# Patient Record
Sex: FEMALE | Employment: FULL TIME | URBAN - METROPOLITAN AREA
[De-identification: names, ages, dates, MRNs, and addresses within clinical notes are randomized per-mention and may not be internally consistent; named-entity substitution may affect disease eponyms.]

---

## 2019-03-20 ENCOUNTER — TELEPHONE (OUTPATIENT)
Dept: OTHER | Facility: CLINIC | Age: 24
End: 2019-03-20

## 2020-02-03 ENCOUNTER — TRANSFERRED RECORDS (OUTPATIENT)
Dept: MULTI SPECIALTY CLINIC | Facility: CLINIC | Age: 25
End: 2020-02-03

## 2020-02-03 LAB
C TRACH DNA SPEC QL PROBE+SIG AMP: NEGATIVE
HEP C HIM: NORMAL
HIV 1&2 EXT: NORMAL
N GONORRHOEA DNA SPEC QL PROBE+SIG AMP: NEGATIVE
PAP-ABSTRACT: NORMAL
SPECIMEN DESCRIP: NORMAL
SPECIMEN DESCRIPTION: NORMAL

## 2021-03-31 NOTE — PATIENT INSTRUCTIONS
Thank you for coming to see the Midwives at the   [facility]!      Please take prenatal vitamin with DHA and vitamin D3 4,000-5,000 international unit(s) once daily during the entire pregnancy.      We will notify you about your labs that were drawn today once we get the results back.  If you have MyChart your lab results will be posted there.      Someone from the clinic will call you personally or send you a [email or web address] message with your results.      If you need any refills of medications please call your pharmacy and they will contact us.      If you have a medical emergency please call 911.      If you have any concerns about today's visit, wish to schedule another appointment, or have an urgent medical concern please call our office at [phone]. You can also make appointments through [email or web address].      After hours you may also call the clinic number above to be connected with [place]'s after hours triage nurse.  The nurse can page the midwife on call if needed. There is always a midwife on call 24 hours a day.    Prenatal Care Recommendations:    Before 14 weeks: Dating ultrasound, genetic testing       This ultrasound helps us determine your dates accurately. Innatal (genetic screening test) can be drawn anytime after 10 weeks of gestation.    16 weeks: Optional genetic testing single AFP       This testing helps understand your baby's risk for some genetic abnormalities.    18-22 weeks:  Screening anatomy ultrasound       This testing will look for early growth abnormalities, placenta location, and may tell the baby's gender if you wish to find out.    24-27 weeks: One hour diabetes test (GCT) and complete blood count       This test helps identify diabetes of pregnancy or gestational diabetes.  We also look   at the iron in your blood and how well your blood clots.    28 - 36 weeks: Tetanus shot (Tdap)       This shot helps protect you and your baby from whooping cough.    36 weeks and  later: Group B Strep test (GBS)       This test helps predict if you need antibiotics in labor to prevent infection for your baby.    Anytime September to April:  Flu shot       This shot helps protect you and your family from the flu.  This is especially important during pregnancy.        The typical schedule after your first visit today you can expect:     Visit 2 - 12-16 weeks  Visit 3 - 20 weeks  Visit 4 - 24 weeks  Visit 5 - 28 weeks  Visit 6 - 30 weeks  Visit 7 - 32 weeks  Visit 8 - 34 weeks  Visit 9 - 36 weeks  Weekly after 36 weeks until delivery.        Any time during or after your pregnancy you may experience increased depression and/or mood changes.    We are here to support you. Please contact us if you are:    Feeling anxious    Overwhelmed or sad     Trouble sleeping    Crying uncontrollably    Trouble caring for yourself or baby.    Any thoughts of hurting yourself, your baby, or anyone else    If anything comes up between your visits or you have concerns please don't hesitate to contact us.    Secure access to your medical record:  Use Dragonplay (secure email communication and access to your chart) to send your primary care provider a message or make an appointment. Ask someone on your Team how to sign up for Dragonplay. To log on to [employer] or for more information in Dragonplay please visit the website at [email or web address].       Certified Nurse Midwife (CNM) Team    CHEVY Ang CNM Melissa Kitzman APRN, CNM, [facility]  ALIYA Atkinson DNP, ALIYA Ham DNP, CHEVY      Again, thank you for choosing the midwives at [facility].  We are excited to be a part of your pregnancy. Please let us know how we can best partner with you to improve your and your family's health.    Remedies for nausea and vomiting with pregnancy      Eat small frequent meals every 2-3 hours if possible.       Avoid food at extremes of temperature and  drinks with carbonation.      Eat foods that appeal to you, avoiding fats and spicy foods.      Avoid liquids with foods.  Drink liquids 30-60 minutes before or after eating and sip slowly.      Bread, pasta, crackers, potatoes, and rice tend to be tolerated the best.      Don't worry about what you eat in the first 3 months, it is more important that you can eat and keep it down.       Try flat ginger ale or ginger tea      Before rising in the morning, eat a small amount of crackers or dry toast.      Peppermint tea      Ginger is a herbal remedy for nausea and you can use it in any form.  There are ginger tablets you can purchase.  The dose 1000 mg a day in divided doses.       You may also try doxylamine (Unisom) 12.5 mg three times a day which is a sleeping medication along with Vitamin B6 25 mg three times a day.  This combination takes up to a week to work so give it some time.       Benadryl (diphenhydramine) 25-50 mg every 8 hour or Dramamine (dimenhydrinate)  mg by mouth every 4-6 hours. Both of these medication may cause some drowsiness      Other things that may help include an Accupressure band and acupuncture      If these methods fail there are many prescriptions that we can try    If you begin to vomit more than 5 or 6 times a day and feel that you are unable to keep anything down, call the [facility] at [phone]    Recommendations for total and rate of weight gain during pregnancy, by prepregnancy BMI    Total weight gain Rates of weight gain*  2nd and 3rd trimester   Prepregnancy BMI Range in kg Range in lbs Mean (range) in kg/week Mean (range) in lbs/week   Underweight (<18.5 kg/m2) 12.5-18 28-40 0.51 (0.44-0.58) 1 (1-1.3)   Normal weight (18.5-24.9 kg/m2) 11.5-16 25-35 0.42 (0.35-0.50) 1 (0.8-1)   Overweight (25.0-29.9 kg/m2) 7-11.5 15-25 0.28 (0.23-0.33) 0.6 (0.5-0.7)   Obese (?30.0 kg/m2) 5-9 11-20 0.22 (0.17-0.27) 0.5 (0.4-0.6)   BMI: body mass index.  *  Calculations assume a 0.5-2 kg (1.1-4.4 lbs) weight gain in the first trimester.  * To calculate BMI go to [email or web address]/      Constipation    Constipation can be caused by many factors such as poor diet, lack of activity, and medications. Often times women experience more constipation during pregnancy due to decreased intestinal motility.    DRINK TONS OF WATER! 2.5 liters (4 pints) of water per day      Activity is very important. Increase your daily activity to at least 20-60 minutes. This increases blood flow to your gut and improves bowel function    Limit caffeine and alcohol intake    Avoid foods you've identified as constipating    Increase fiber intake: there are ways to increase you fiber through your diet but there are also OTC agents such as Metamucil or Benfiber that you can supplement your diet with    Use probiotics such as Florastor and/or prebiotics such as oligosaccharides    Consider using an Omega 3 supplement, flaxseed and chapin seeds are great sources    Plan adequate time for elimination, it is most effective right after activity, and it may be beneficial to plan a consistent time daily    Food Suggestions      Eat beans, nuts, whole grain breads and cereals, oats, barley, figs, apples with skin, raisins, green leafy vegetables, fresh and dried fruits (especially grapes), prunes or prune juice    Eat popcorn nightly    Eat 2-3 salads per day    Add a pinch of cayenne pepper to food    1-2 tbsp of olive oil per day    Lemon juice and/or honey in warm water every morning before breakfast    Decrease red meat, refined white flour products like white rice, white bread and pasta    Tea infusions of: rosemary, chamomile, lemon balm, senna leaf, alfalfa, fennel seeds, lavender, cascara, dandelion, licorice root tea, psyllium seeds, marshmallow root    Other remedies      Increase Vitamin B and E (800 IU if not pregnant, 400 IU if pregnant per day) and potassium, calcium, and magnesium  supplements      If these remedies fail, medications are an option as well. Please talk with your midwife if you continue to struggle with constipation. If you are pregnant please double check with us before starting any medications!

## 2021-04-02 ENCOUNTER — PRENATAL OFFICE VISIT (OUTPATIENT)
Dept: NURSING | Facility: CLINIC | Age: 26
End: 2021-04-02

## 2021-04-02 DIAGNOSIS — Z34.91 SUPERVISION OF NORMAL PREGNANCY IN FIRST TRIMESTER: ICD-10-CM

## 2021-04-02 DIAGNOSIS — O36.80X0 PREGNANCY WITH INCONCLUSIVE FETAL VIABILITY: Primary | ICD-10-CM

## 2021-04-02 PROCEDURE — 99207 PR NO CHARGE NURSE ONLY: CPT

## 2021-04-02 RX ORDER — METRONIDAZOLE 7.5 MG/G
1 GEL VAGINAL DAILY
COMMUNITY
End: 2021-04-05

## 2021-04-02 RX ORDER — CALCIUM CARBONATE 500 MG/1
1 TABLET, CHEWABLE ORAL PRN
COMMUNITY
End: 2021-08-13

## 2021-04-02 SDOH — HEALTH STABILITY: MENTAL HEALTH: HOW OFTEN DO YOU HAVE A DRINK CONTAINING ALCOHOL?: NEVER

## 2021-04-02 ASSESSMENT — ANXIETY QUESTIONNAIRES
2. NOT BEING ABLE TO STOP OR CONTROL WORRYING: NOT AT ALL
IF YOU CHECKED OFF ANY PROBLEMS ON THIS QUESTIONNAIRE, HOW DIFFICULT HAVE THESE PROBLEMS MADE IT FOR YOU TO DO YOUR WORK, TAKE CARE OF THINGS AT HOME, OR GET ALONG WITH OTHER PEOPLE: NOT DIFFICULT AT ALL
GAD7 TOTAL SCORE: 2
5. BEING SO RESTLESS THAT IT IS HARD TO SIT STILL: NOT AT ALL
7. FEELING AFRAID AS IF SOMETHING AWFUL MIGHT HAPPEN: NOT AT ALL
6. BECOMING EASILY ANNOYED OR IRRITABLE: SEVERAL DAYS
3. WORRYING TOO MUCH ABOUT DIFFERENT THINGS: NOT AT ALL
1. FEELING NERVOUS, ANXIOUS, OR ON EDGE: NOT AT ALL

## 2021-04-02 ASSESSMENT — PATIENT HEALTH QUESTIONNAIRE - PHQ9
5. POOR APPETITE OR OVEREATING: SEVERAL DAYS
SUM OF ALL RESPONSES TO PHQ QUESTIONS 1-9: 3

## 2021-04-02 NOTE — PROGRESS NOTES
SUBJECTIVE:     HPI:    This is a 26 year old female patient,  who presents for her first obstetrical visit.    GINA: 10/19/2021, by Last Menstrual Period.  She is 11w3d weeks.  Her cycles are regular.  Her last menstrual period was normal.   Since her LMP, she has experienced  nausea, emesis, fatigue, urinary frequency and gassiness).   She denies abdominal pain, headache, loss of appetite, vaginal discharge, dysuria, pelvic pain, urinary urgency, lightheadedness, vaginal bleeding, hemorrhoids and constipation.    Additional History: HSV2, BV  Post partum depression with 1st pregnancy  Mental breakdown     Have you travelled during the pregnancy?No  Have your sexual partner(s) travelled during the pregnancy?No    HISTORY:   Planned Pregnancy: No, but doing ok with it  Marital Status: Single, FOB involved  Occupation: , works from home  Living in Household: Significant Other and Children, Dog    Past History:  Her past medical history   Past Medical History:   Diagnosis Date     Abnormal Pap smear of cervix      Herpes simplex virus (HSV) infection     HSV2 (Diagnosed 1 year ago - asymptomatic)     Postpartum depression      Urinary tract infection    .      She has a history of  N/A    Since her last LMP she denies use of alcohol, tobacco and street drugs.    Past medical, surgical, social and family history were reviewed and updated in [email or web address].    Current Outpatient Medications   Medication     calcium carbonate (TUMS) 500 MG chewable tablet     metroNIDAZOLE (METROGEL) 0.75 % vaginal gel     Prenatal Vit-Fe Fumarate-FA (PRENATAL VITAMIN PO)     No current facility-administered medications for this visit.        ROS:   12 point review of systems negative other than symptoms noted below or in the HPI.  Constitutional: Fatigue  Gastrointestinal: Nausea, Vomiting and Gas    Nurse phone visit completed. Prenatal book and folder (containing standard educational hand-outs and brochures) will  be given to patient at next visit. Information in folder reviewed over the phone. Questions answered. Brochure will be given on optional screening available to assess chromosomal anomalies. Pt undecided regarding NIPT. Pt advised to call the clinic if she has any questions or concerns related to her pregnancy. Prenatal labs future ordered. New prenatal visit scheduled on 4/5/21 with Carmen Santoyo CNM.    No results found for: PAP  PHQ-9 score:    PHQ 4/2/2021   PHQ-9 Total Score 3   Q9: Thoughts of better off dead/self-harm past 2 weeks Not at all     ROBERTA-7 SCORE 4/2/2021   Total Score 2     Patient supplied answers from flow sheet for:  Prenatal OB Questionnaire.  Past Medical History  Have you ever recieved care for your mental health? : (!) Yes  Mental Breakdown 2020  Within the last year, has anyone hit, slapped, kicked or otherwise hurt you?: No  In the last year, has anyone forced you to have sex when you didn't want to?: No    Past Medical History 2   Have you ever received a blood transfusion?: No  Would you accept a blood transfusion if was medically recommended?: Yes  Does anyone in your home smoke?: (!) Yes (E-cig)  Have you ever ?: (!) Yes (Not successfully)  Have you been hospitalized for a nonsurgical reason excluding normal delivery?: (!) Yes (Mental breakdown - 1 year ago)  Have you ever had an abnormal pap smear?: (!) Yes (HPV)    Past Medical History (Continued)  Did your mother take DIVINE or any other hormones when she was pregnant with you?: Unknown    Belkys Logan RN on 4/2/2021 at 2:19 PM

## 2021-04-03 ASSESSMENT — ANXIETY QUESTIONNAIRES: GAD7 TOTAL SCORE: 2

## 2021-04-05 ENCOUNTER — PRENATAL OFFICE VISIT (OUTPATIENT)
Dept: MIDWIFE SERVICES | Facility: CLINIC | Age: 26
End: 2021-04-05
Payer: COMMERCIAL

## 2021-04-05 ENCOUNTER — ANCILLARY PROCEDURE (OUTPATIENT)
Dept: ULTRASOUND IMAGING | Facility: CLINIC | Age: 26
End: 2021-04-05
Payer: COMMERCIAL

## 2021-04-05 VITALS
SYSTOLIC BLOOD PRESSURE: 110 MMHG | HEART RATE: 76 BPM | DIASTOLIC BLOOD PRESSURE: 72 MMHG | WEIGHT: 143 LBS | HEIGHT: 68 IN | BODY MASS INDEX: 21.67 KG/M2

## 2021-04-05 DIAGNOSIS — O36.80X0 PREGNANCY WITH INCONCLUSIVE FETAL VIABILITY: ICD-10-CM

## 2021-04-05 DIAGNOSIS — O09.91 SUPERVISION OF HIGH RISK PREGNANCY IN FIRST TRIMESTER: ICD-10-CM

## 2021-04-05 DIAGNOSIS — Z86.59 HISTORY OF POSTPARTUM DEPRESSION: ICD-10-CM

## 2021-04-05 DIAGNOSIS — Z34.81 ENCOUNTER FOR SUPERVISION OF OTHER NORMAL PREGNANCY IN FIRST TRIMESTER: ICD-10-CM

## 2021-04-05 DIAGNOSIS — Z87.59 HISTORY OF POSTPARTUM DEPRESSION: ICD-10-CM

## 2021-04-05 DIAGNOSIS — R76.8 HSV-2 SEROPOSITIVE: ICD-10-CM

## 2021-04-05 DIAGNOSIS — Z86.59 HISTORY OF SUICIDAL IDEATION: ICD-10-CM

## 2021-04-05 PROBLEM — R87.610 ASCUS WITH POSITIVE HIGH RISK HPV CERVICAL: Status: ACTIVE | Noted: 2018-02-20

## 2021-04-05 PROBLEM — L70.0 ACNE VULGARIS: Status: ACTIVE | Noted: 2021-04-05

## 2021-04-05 PROBLEM — J45.909 ASTHMA: Status: ACTIVE | Noted: 2021-04-05

## 2021-04-05 PROBLEM — R87.810 ASCUS WITH POSITIVE HIGH RISK HPV CERVICAL: Status: ACTIVE | Noted: 2018-02-20

## 2021-04-05 PROBLEM — O09.90 PREGNANCY, SUPERVISION, HIGH-RISK: Status: ACTIVE | Noted: 2021-04-02

## 2021-04-05 LAB
ABO + RH BLD: NORMAL
ABO + RH BLD: NORMAL
ALBUMIN UR-MCNC: NEGATIVE MG/DL
APPEARANCE UR: CLEAR
BILIRUB UR QL STRIP: NEGATIVE
BLD GP AB SCN SERPL QL: NORMAL
BLOOD BANK CMNT PATIENT-IMP: NORMAL
COLOR UR AUTO: YELLOW
ERYTHROCYTE [DISTWIDTH] IN BLOOD BY AUTOMATED COUNT: 13.4 % (ref 10–15)
GLUCOSE UR STRIP-MCNC: NEGATIVE MG/DL
HCT VFR BLD AUTO: 35.3 % (ref 35–47)
HGB BLD-MCNC: 12.1 G/DL (ref 11.7–15.7)
HGB UR QL STRIP: NEGATIVE
KETONES UR STRIP-MCNC: NEGATIVE MG/DL
LEUKOCYTE ESTERASE UR QL STRIP: NEGATIVE
MCH RBC QN AUTO: 26.8 PG (ref 26.5–33)
MCHC RBC AUTO-ENTMCNC: 34.3 G/DL (ref 31.5–36.5)
MCV RBC AUTO: 78 FL (ref 78–100)
NITRATE UR QL: NEGATIVE
PH UR STRIP: 7 PH (ref 5–7)
PLATELET # BLD AUTO: 380 10E9/L (ref 150–450)
RBC # BLD AUTO: 4.51 10E12/L (ref 3.8–5.2)
SOURCE: NORMAL
SP GR UR STRIP: 1.02 (ref 1–1.03)
SPECIMEN EXP DATE BLD: NORMAL
UROBILINOGEN UR STRIP-ACNC: 0.2 EU/DL (ref 0.2–1)
WBC # BLD AUTO: 9 10E9/L (ref 4–11)

## 2021-04-05 PROCEDURE — 76801 OB US < 14 WKS SINGLE FETUS: CPT | Performed by: OBSTETRICS & GYNECOLOGY

## 2021-04-05 PROCEDURE — 85027 COMPLETE CBC AUTOMATED: CPT | Performed by: ADVANCED PRACTICE MIDWIFE

## 2021-04-05 PROCEDURE — 99000 SPECIMEN HANDLING OFFICE-LAB: CPT | Performed by: ADVANCED PRACTICE MIDWIFE

## 2021-04-05 PROCEDURE — 87340 HEPATITIS B SURFACE AG IA: CPT | Performed by: ADVANCED PRACTICE MIDWIFE

## 2021-04-05 PROCEDURE — 86762 RUBELLA ANTIBODY: CPT | Performed by: ADVANCED PRACTICE MIDWIFE

## 2021-04-05 PROCEDURE — 87086 URINE CULTURE/COLONY COUNT: CPT | Performed by: ADVANCED PRACTICE MIDWIFE

## 2021-04-05 PROCEDURE — 86780 TREPONEMA PALLIDUM: CPT | Mod: 90 | Performed by: ADVANCED PRACTICE MIDWIFE

## 2021-04-05 PROCEDURE — 86901 BLOOD TYPING SEROLOGIC RH(D): CPT | Performed by: ADVANCED PRACTICE MIDWIFE

## 2021-04-05 PROCEDURE — 36415 COLL VENOUS BLD VENIPUNCTURE: CPT | Performed by: ADVANCED PRACTICE MIDWIFE

## 2021-04-05 PROCEDURE — 99207 PR FIRST OB VISIT: CPT | Performed by: ADVANCED PRACTICE MIDWIFE

## 2021-04-05 PROCEDURE — 86900 BLOOD TYPING SEROLOGIC ABO: CPT | Performed by: ADVANCED PRACTICE MIDWIFE

## 2021-04-05 PROCEDURE — 86850 RBC ANTIBODY SCREEN: CPT | Performed by: ADVANCED PRACTICE MIDWIFE

## 2021-04-05 PROCEDURE — 99N1100 PR STATISTIC VERIFI PRENATAL TRISOMY 21,18,13: Mod: 90 | Performed by: ADVANCED PRACTICE MIDWIFE

## 2021-04-05 PROCEDURE — 87389 HIV-1 AG W/HIV-1&-2 AB AG IA: CPT | Performed by: ADVANCED PRACTICE MIDWIFE

## 2021-04-05 PROCEDURE — 81003 URINALYSIS AUTO W/O SCOPE: CPT | Performed by: ADVANCED PRACTICE MIDWIFE

## 2021-04-05 ASSESSMENT — MIFFLIN-ST. JEOR: SCORE: 1447.14

## 2021-04-05 NOTE — Clinical Note
Please abstract the following data from this visit with this patient into the appropriate field in Epic:        Other Tests found in the patient's chart through Chart Review/Care Everywhere:    Pap smear done by this group [facility] this date: 2/3/20    Note to [employer]: If this section is blank, no results were found via Chart Review/Care Everywhere.

## 2021-04-05 NOTE — PROGRESS NOTES
"   SUBJECTIVE:      HPI:     This is a 26 year old female patient,  who presents for her first obstetrical visit.     GINA: 10/19/2021, by Last Menstrual Period.  She is 11w3d weeks.  Her cycles are regular.  Her last menstrual period was normal.   Since her LMP, she has experienced  nausea, emesis, fatigue, urinary frequency and gassiness).   She denies abdominal pain, headache, loss of appetite, vaginal discharge, dysuria, pelvic pain, urinary urgency, lightheadedness, vaginal bleeding, hemorrhoids and constipation.     Additional History: Hx HSV2 positive. Currently being treated for BV from primary care provider. States she gets BV infections frequently.     Reports mental breakdown in  related to HSV diagnosis, OB provider recommended ER visit d/t suicidal comments, she was evaluated at [facility] ED and ultimately discharged, she did not require psychiatric hospitalization. Laurence reports her mood is stable right now and does not have any concerns, denies thoughts of suicide. Also had postpartum depression after first baby states \"it was pretty bad but I got through it.\" She did not take any medications and did not do any counseling.   This was a surprise pregnancy but is excited about it, had a mild nausea which resolved a couple weeks ago.        Have you travelled during the pregnancy?No  Have your sexual partner(s) travelled during the pregnancy?No     HISTORY:   Planned Pregnancy: No, but doing ok with it  Marital Status: Single, FOB involved  Occupation: , works from home  Living in Household: Significant Other and Children, Dog        OB HISTORY  OB History    Para Term  AB Living   3 1 1 0 1 1   SAB TAB Ectopic Multiple Live Births   0 0 0 0 1      # Outcome Date GA Lbr Jeyson/2nd Weight Sex Delivery Anes PTL Lv   3 Current            2 AB 2014     TAB      1 Term 13 40w3d   M Vag-Spont   HEIDI      Name: Celestine       History of GDM: No,  PTL : " No,  History of HTN in pregnancy: No,  Thrombocytopenia: No,  History of Sickle Cell, thalassemia or other hereditary blood disorder: No,  Shoulder dystocia: No,  Vacuum Extraction: No  PPH: No   3rd of 4th degree laceration: No   Other complications: No    PERSONAL HISTORY  Exercise Habits:  walking 4-7 days per week.  Employment: Full time.  Her job involves sedentary activity with no potential for toxic exposure.    Travel plans:  are none planned.   Diet: eats regular meals  Abuse concerns? No  Hgb A1c screen:  BMI > 30: No, 1st degree family DM: No, History of GDM: No, PCOS: No, High risk ethnicity: No        Past History:  Her past medical history   Past Medical History        Past Medical History:   Diagnosis Date     Abnormal Pap smear of cervix       Herpes simplex virus (HSV) infection       HSV2 (Diagnosed 1 year ago - asymptomatic)     Postpartum depression       Urinary tract infection        .       She has a history of  N/A     Since her last LMP she denies use of alcohol, tobacco and street drugs.     Past medical, surgical, social and family history were reviewed and updated in [facility].     Current Outpatient Medications   Medication     calcium carbonate (TUMS) 500 MG chewable tablet     metroNIDAZOLE (METROGEL) 0.75 % vaginal gel     Prenatal Vit-Fe Fumarate-FA (PRENATAL VITAMIN PO)      No current facility-administered medications for this visit.          ROS:   12 point review of systems negative other than symptoms noted below or in the HPI.  Constitutional: Fatigue  Gastrointestinal: Nausea, Vomiting and Gas     Nurse phone visit completed. Prenatal book and folder (containing standard educational hand-outs and brochures) will be given to patient at next visit. Information in folder reviewed over the phone. Questions answered. Brochure will be given on optional screening available to assess chromosomal anomalies. Pt undecided regarding NIPT. Pt advised to call the clinic if she has any questions  "or concerns related to her pregnancy. Prenatal labs future ordered. New prenatal visit scheduled on 4/5/21 with Carmen Santoyo CNM.     No results found for: PAP  PHQ-9 score:    PHQ 4/2/2021   PHQ-9 Total Score 3   Q9: Thoughts of better off dead/self-harm past 2 weeks Not at all      ROBERTA-7 SCORE 4/2/2021   Total Score 2      Patient supplied answers from flow sheet for:  Prenatal OB Questionnaire.  Past Medical History  Have you ever recieved care for your mental health? : (!) Yes  Mental Breakdown 2020  Within the last year, has anyone hit, slapped, kicked or otherwise hurt you?: No  In the last year, has anyone forced you to have sex when you didn't want to?: No     Past Medical History 2   Have you ever received a blood transfusion?: No  Would you accept a blood transfusion if was medically recommended?: Yes  Does anyone in your home smoke?: (!) Yes (E-cig)  Have you ever ?: (!) Yes (Not successfully)  Have you been hospitalized for a nonsurgical reason excluding normal delivery?: (!) Yes (Mental breakdown - 1 year ago)  Have you ever had an abnormal pap smear?: (!) Yes (HPV)     Past Medical History (Continued)  Did your mother take DIVINE or any other hormones when she was pregnant with you?: Unknown     Belkys Logan RN on 4/2/2021 at 2:19 PM        OBJECTIVE:     EXAM:  /72   Pulse 76   Ht 1.727 m (5' 8\")   Wt 64.9 kg (143 lb)   LMP 01/12/2021   BMI 21.74 kg/m   Body mass index is 21.74 kg/m .    GENERAL: healthy, alert and no distress  EYES: Eyes grossly normal to inspection, PERRL and conjunctivae and sclerae normal  HENT: ear canals and TM's normal, nose and mouth without ulcers or lesions  NECK: no adenopathy, no asymmetry, masses, or scars and thyroid normal to palpation  RESP: lungs clear to auscultation - no rales, rhonchi or wheezes  CV: regular rate and rhythm, normal S1 S2, no S3 or S4, no murmur, click or rub, no peripheral edema   ABDOMEN: soft, nontender, no " hepatosplenomegaly, no masses and bowel sounds normal  MS: no gross musculoskeletal defects noted, no edema  SKIN: no suspicious lesions or rashes  NEURO: Normal strength and tone, mentation intact and speech normal  PSYCH: mentation appears normal, affect normal/bright    ASSESSMENT/PLAN:       ICD-10-CM    1. Encounter for supervision of other normal pregnancy in first trimester  Z34.81 Treponema Abs w Reflex to RPR and Titer     Rubella Antibody IgG Quantitative     HIV Antigen Antibody Combo     CBC with platelets     Hepatitis B surface antigen     ABO/Rh type and screen     Non Invasive Prenatal Test Cell Free DNA   2. HSV-2 seropositive  R76.8    3. History of postpartum depression  Z87.59     Z86.59    4. Supervision of high risk pregnancy in first trimester  O09.91    5. History of suicidal ideation  Z86.59        26 year old , On 2021 patient is 11w6d weeks of pregnancy with GINA of 10/19/2021, by Last Menstrual Period       consult for US for AMA patients: No   Genetic Testing reviewed and discussed, patient desires. Handout provided, consent reviewed and signed. To be drawn with rest of labs today.     COUNSELING    Instructed on use of triage nurse line and contacting the on call CNM after hours in an emergency.     Discussed importance of mental health support in pregnancy. Encouraged to let us know if she feels her mood symptoms worsening or has any concerns at all. Discussed increased risk for PPD given history.     Discussed recommendation for HSV prophylaxis at 36 weeks.     Advised to let us know if BV symptoms do not improved after abx course. Will collect wet prep at next appt to ensure infection has resolved.     Reviewed CNM philosophy, call schedule for labor and delivery, and FSH for delivery    1st OB handout given outlining appointment spacing and CNM information    Reviewed exercise and nutrition    Recommend to gain 25 pounds with her pregnancy.    Discussed OTC  "medications. OB med list given    Encouraged patient to take PNV's/DHA    Travel precautions discussed, no air travel after 36 weeks and COVID recommendations discussed    Will call patient with lab results when available      F/U to be addressed next visit: wet prep, mood check.     Upon further review of chart related to ER visit d/t suicidal ideation: pt made comment that she \"may have committed suicide if she received her positive HSV results over the phone\"  so incase of positive/sensitive lab results, give the results in person visit.     Will return to the clinic in 4 weeks for her next routine prenatal check.  Will call to be seen sooner if problems arise.      FLOR Stephens  [facility]      I, Shantel Muir, am serving as a scribe; to document services personally performed by ALIYA Bah CNM- based on data collection and the provider's statements to me.    Provider Disclosure:  I agree with above History, Review of Systems, Physical exam and Plan. I have reviewed the content of the documentation and have edited it as needed. I have personally performed the services documented here and the documentation accurately represents those services and the decisions I have made.    Carmen Hill,        "

## 2021-04-06 LAB
BACTERIA SPEC CULT: NORMAL
HBV SURFACE AG SERPL QL IA: NONREACTIVE
HIV 1+2 AB+HIV1 P24 AG SERPL QL IA: NONREACTIVE
Lab: NORMAL
RUBV IGG SERPL IA-ACNC: 34 IU/ML
SPECIMEN SOURCE: NORMAL
T PALLIDUM AB SER QL: NONREACTIVE

## 2021-04-06 NOTE — RESULT ENCOUNTER NOTE
Results discussed directly with patient while patient was present. Any further details documented in the note.     ALIYA Denny, OSWALDOM

## 2021-04-07 NOTE — RESULT ENCOUNTER NOTE
Please call Laurence and let her know that all of her labs came back as normal for pregnancy.  Thanks!    Elaine PISANO CNM, [facility]  [phone]

## 2021-04-08 LAB — LAB SCANNED RESULT: NORMAL

## 2021-04-09 ENCOUNTER — TELEPHONE (OUTPATIENT)
Dept: MIDWIFE SERVICES | Facility: CLINIC | Age: 26
End: 2021-04-09

## 2021-04-09 NOTE — TELEPHONE ENCOUNTER
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    Testing done:  Innatal Prenatal Screen    Action:  Mailbox full Sent [employer] message with negative result and to call/message w  Questions or interested in gender    Gender:  Will ask patient if they wish to know the gender.    Phyllis Johnson RN

## 2021-04-10 ENCOUNTER — HEALTH MAINTENANCE LETTER (OUTPATIENT)
Age: 26
End: 2021-04-10

## 2021-04-30 ENCOUNTER — PRENATAL OFFICE VISIT (OUTPATIENT)
Dept: OBGYN | Facility: CLINIC | Age: 26
End: 2021-04-30
Payer: COMMERCIAL

## 2021-04-30 VITALS — SYSTOLIC BLOOD PRESSURE: 110 MMHG | DIASTOLIC BLOOD PRESSURE: 70 MMHG | BODY MASS INDEX: 23.42 KG/M2 | WEIGHT: 154 LBS

## 2021-04-30 DIAGNOSIS — Z36.89 ENCOUNTER FOR FETAL ANATOMIC SURVEY: Primary | ICD-10-CM

## 2021-04-30 DIAGNOSIS — G43.009 MIGRAINE WITHOUT AURA AND WITHOUT STATUS MIGRAINOSUS, NOT INTRACTABLE: ICD-10-CM

## 2021-04-30 DIAGNOSIS — O09.92 SUPERVISION OF HIGH RISK PREGNANCY IN SECOND TRIMESTER: ICD-10-CM

## 2021-04-30 PROCEDURE — 36415 COLL VENOUS BLD VENIPUNCTURE: CPT | Performed by: OBSTETRICS & GYNECOLOGY

## 2021-04-30 PROCEDURE — 82105 ALPHA-FETOPROTEIN SERUM: CPT | Mod: 90 | Performed by: OBSTETRICS & GYNECOLOGY

## 2021-04-30 PROCEDURE — 99000 SPECIMEN HANDLING OFFICE-LAB: CPT | Performed by: OBSTETRICS & GYNECOLOGY

## 2021-04-30 PROCEDURE — 99207 PR PRENATAL VISIT: CPT | Performed by: OBSTETRICS & GYNECOLOGY

## 2021-04-30 RX ORDER — MULTIVITAMIN WITH IRON
1 TABLET ORAL DAILY
Qty: 90 TABLET | Refills: 3 | Status: SHIPPED | OUTPATIENT
Start: 2021-04-30 | End: 2021-08-13

## 2021-04-30 NOTE — PROGRESS NOTES
Anatomy ultrasound ordered  Headaches- discussed options  Could consider other medication depending on frequency  Discussed magnesium  No vaginal bleeding  afp today

## 2021-05-03 LAB
# FETUSES US: NORMAL
# FETUSES: 1
AFP ADJ MOM AMN: 0.65
AFP SERPL-MCNC: 21 NG/ML
AGE - REPORTED: 24.8 YR
CURRENT SMOKER: NO
CURRENT SMOKER: NO
DIABETES STATUS PATIENT: NO
EGG DONOR AGE: NORMAL
FAMILY MEMBER DISEASES HX: NO
FAMILY MEMBER DISEASES HX: NO
GA METHOD: NORMAL
GA METHOD: NORMAL
GA: NORMAL WK
IDDM PATIENT QL: NO
INTEGRATED SCN PATIENT-IMP: NORMAL
IVF PREGNANCY: NO
LMP START DATE: NORMAL
MONOCHORIONIC TWINS: NO
SERVICE CMNT-IMP: NO
SPECIMEN DRAWN SERPL: NORMAL
VALPROIC/CARBAMAZEPINE STATUS: NO
WEIGHT UNITS: NORMAL

## 2021-05-18 NOTE — PATIENT INSTRUCTIONS
Constipation    Constipation can be caused by many factors such as poor diet, lack of activity, and medications. Often times women experience more constipation during pregnancy due to decreased intestinal motility.    DRINK TONS OF WATER! 2.5 liters (4 pints) of water per day      Activity is very important. Increase your daily activity to at least 20-60 minutes. This increases blood flow to your gut and improves bowel function    Limit caffeine and alcohol intake    Avoid foods you've identified as constipating    Increase fiber intake: there are ways to increase you fiber through your diet but there are also OTC agents such as Metamucil or Benfiber that you can supplement your diet with    Use probiotics such as Florastor and/or prebiotics such as oligosaccharides    Consider using an Omega 3 supplement, flaxseed and chapin seeds are great sources    Plan adequate time for elimination, it is most effective right after activity, and it may be beneficial to plan a consistent time daily    Food Suggestions      Eat beans, nuts, whole grain breads and cereals, oats, barley, figs, apples with skin, raisins, green leafy vegetables, fresh and dried fruits (especially grapes), prunes or prune juice    Eat popcorn nightly    Eat 2-3 salads per day    Add a pinch of cayenne pepper to food    1-2 tbsp of olive oil per day    Lemon juice and/or honey in warm water every morning before breakfast    Decrease red meat, refined white flour products like white rice, white bread and pasta    Tea infusions of: rosemary, chamomile, lemon balm, senna leaf, alfalfa, fennel seeds, lavender, cascara, dandelion, licorice root tea, psyllium seeds, marshmallow root    Other remedies      Increase Vitamin B and E (800 IU if not pregnant, 400 IU if pregnant per day) and potassium, calcium, and magnesium supplements      If these remedies fail, medications are an option as well. Please talk with your midwife if you continue to struggle with  constipation. If you are pregnant please double check with us before starting any medications!    Fiber Facts    A daily intake of about 25-30 grams of fiber is recommended. Most Americans only get about 12 grams of fiber on average. Fiber is very important in the diet to promote bowel regularity, lower cholesterol, lower risk of developing type 2 diabetes, and decrease chance of weight gain.  In pregnancy your intestinal motility slows down, so fiber is even more important.    Start gradually increasing fiber intake to reduce the risk of bloating and gas. Increase by about 5 grams a day every few days until you reach your fiber goals!    Food      Amount   Grams of Fiber  Grains  [place]'s All Bran Cereal   1/2 cup   10  Natural [employer]'s Stay Trim Bread 1 slice    5  Brown Rice (cooked)  1 cup    4  Cheerios    1 cup    3  Whole Wheat Crackers  5 crackers   3.5  Rye crackers    3 crackers   3    Cereals and whole grains are excellent ways to increase fiber in your diet. Try to find cereals that have at least 8 grams of fiber per serving.    Fruits  Blackberries     1 cup    7   Figs      3 dried   7  Apple      1     4  Pear     1    4   Orange    1    3    Vegetables  Winter squash   1 cup    9  Broccoli     1 cup    4  Corn      1 cup    4  Swiss chard (cooked)  1 cup     4  Cauliflower     1 cup     3  Potato     1 large w/skin  5    Beans  Kidney, red    1/2 cup   8  Lentils     1/2 cup cooked  8  Black     1/2 cup    7  Navy     1/2 cup   7  Walker     1/2 cup   7  White      1/2 cup   6  Garbanzo/Chickpeas  1/2 cup   5

## 2021-05-18 NOTE — PROGRESS NOTES
Feels well overall.   Fetal movement No  Denies loss of fluid/vb/contractions/pelvic pain  Headaches in pregnancy. Has not started the magnesium rx'd by Dr. Akhtar. She has taken Excedrin a few times when headaches get really bad (about 3 times in pregnancy). Discussed Excedrin is okay if used rarely (less than 1x every few weeks). Want her to start taking the Mg. If that doesn't help we can rx Imitrex.   Frequent BV. Currently no symptoms today. She knows when she gets it and will let us know.   GC/Chlamydia urine collection today  Return to clinic 2-4 weeks for anatomy ultrasound and prenatal appt    Beth Sanchez, DNP, APRN, CNM

## 2021-05-19 ENCOUNTER — PRENATAL OFFICE VISIT (OUTPATIENT)
Dept: MIDWIFE SERVICES | Facility: CLINIC | Age: 26
End: 2021-05-19
Payer: COMMERCIAL

## 2021-05-19 VITALS — DIASTOLIC BLOOD PRESSURE: 68 MMHG | WEIGHT: 155 LBS | SYSTOLIC BLOOD PRESSURE: 112 MMHG | BODY MASS INDEX: 23.57 KG/M2

## 2021-05-19 DIAGNOSIS — Z86.59 HISTORY OF POSTPARTUM DEPRESSION: ICD-10-CM

## 2021-05-19 DIAGNOSIS — Z11.8 ENCOUNTER FOR SCREENING EXAMINATION FOR CHLAMYDIAL INFECTION: ICD-10-CM

## 2021-05-19 DIAGNOSIS — Z87.59 HISTORY OF POSTPARTUM DEPRESSION: ICD-10-CM

## 2021-05-19 DIAGNOSIS — Z11.3 SCREENING FOR GONORRHEA: ICD-10-CM

## 2021-05-19 DIAGNOSIS — O09.92 SUPERVISION OF HIGH RISK PREGNANCY IN SECOND TRIMESTER: Primary | ICD-10-CM

## 2021-05-19 PROCEDURE — 87491 CHLMYD TRACH DNA AMP PROBE: CPT | Performed by: ADVANCED PRACTICE MIDWIFE

## 2021-05-19 PROCEDURE — 99207 PR PRENATAL VISIT: CPT | Performed by: ADVANCED PRACTICE MIDWIFE

## 2021-05-19 PROCEDURE — 87591 N.GONORRHOEAE DNA AMP PROB: CPT | Performed by: ADVANCED PRACTICE MIDWIFE

## 2021-05-20 LAB
C TRACH DNA SPEC QL NAA+PROBE: NEGATIVE
N GONORRHOEA DNA SPEC QL NAA+PROBE: NEGATIVE
SPECIMEN SOURCE: NORMAL
SPECIMEN SOURCE: NORMAL

## 2021-05-20 NOTE — RESULT ENCOUNTER NOTE
Informed via [employer].     Elaine PISANO CNM, [facility]  [phone]

## 2021-06-04 ENCOUNTER — ANCILLARY PROCEDURE (OUTPATIENT)
Dept: ULTRASOUND IMAGING | Facility: CLINIC | Age: 26
End: 2021-06-04
Attending: OBSTETRICS & GYNECOLOGY
Payer: COMMERCIAL

## 2021-06-04 ENCOUNTER — PRENATAL OFFICE VISIT (OUTPATIENT)
Dept: OBGYN | Facility: CLINIC | Age: 26
End: 2021-06-04
Attending: OBSTETRICS & GYNECOLOGY
Payer: COMMERCIAL

## 2021-06-04 VITALS — WEIGHT: 159 LBS | DIASTOLIC BLOOD PRESSURE: 60 MMHG | BODY MASS INDEX: 24.18 KG/M2 | SYSTOLIC BLOOD PRESSURE: 110 MMHG

## 2021-06-04 DIAGNOSIS — O09.92 SUPERVISION OF HIGH RISK PREGNANCY IN SECOND TRIMESTER: Primary | ICD-10-CM

## 2021-06-04 DIAGNOSIS — Z36.89 ENCOUNTER FOR FETAL ANATOMIC SURVEY: ICD-10-CM

## 2021-06-04 PROCEDURE — 76805 OB US >/= 14 WKS SNGL FETUS: CPT | Performed by: OBSTETRICS & GYNECOLOGY

## 2021-06-04 PROCEDURE — 99207 PR PRENATAL VISIT: CPT | Performed by: OBSTETRICS & GYNECOLOGY

## 2021-06-04 NOTE — PROGRESS NOTES
Anatomy ultrasound done today  Discussed staying away from litter box  No vaginal bleeding  return to clinic 4 weeks  Discussed covid vaccine

## 2021-07-02 ENCOUNTER — PRENATAL OFFICE VISIT (OUTPATIENT)
Dept: OBGYN | Facility: CLINIC | Age: 26
End: 2021-07-02
Payer: COMMERCIAL

## 2021-07-02 VITALS — SYSTOLIC BLOOD PRESSURE: 102 MMHG | BODY MASS INDEX: 24.48 KG/M2 | DIASTOLIC BLOOD PRESSURE: 62 MMHG | WEIGHT: 161 LBS

## 2021-07-02 DIAGNOSIS — T75.89XA EXPOSURE TO CAT FECES, INITIAL ENCOUNTER: Primary | ICD-10-CM

## 2021-07-02 PROCEDURE — 99207 PR PRENATAL VISIT: CPT | Performed by: OBSTETRICS & GYNECOLOGY

## 2021-07-02 PROCEDURE — 36415 COLL VENOUS BLD VENIPUNCTURE: CPT | Performed by: OBSTETRICS & GYNECOLOGY

## 2021-07-02 PROCEDURE — 99000 SPECIMEN HANDLING OFFICE-LAB: CPT | Performed by: OBSTETRICS & GYNECOLOGY

## 2021-07-02 PROCEDURE — 86777 TOXOPLASMA ANTIBODY: CPT | Performed by: OBSTETRICS & GYNECOLOGY

## 2021-07-02 PROCEDURE — 86778 TOXOPLASMA ANTIBODY IGM: CPT | Mod: 90 | Performed by: OBSTETRICS & GYNECOLOGY

## 2021-07-02 NOTE — PROGRESS NOTES
rtc 4 weeks, glucose, cbc, tdap  Headaches have improved  Round ligament  Discussed support belt  Uncertain exposure to cat feces, will get antibody testing today

## 2021-07-04 LAB
T GONDII IGG SER QL: <3 IU/ML (ref 0–7.1)
T GONDII IGM SER-ACNC: <3 AU/ML

## 2021-07-21 DIAGNOSIS — Z36.9 ENCOUNTER FOR ANTENATAL SCREENING OF MOTHER: Primary | ICD-10-CM

## 2021-07-21 DIAGNOSIS — Z23 NEED FOR TDAP VACCINATION: ICD-10-CM

## 2021-07-30 ENCOUNTER — PRENATAL OFFICE VISIT (OUTPATIENT)
Dept: OBGYN | Facility: CLINIC | Age: 26
End: 2021-07-30
Payer: COMMERCIAL

## 2021-07-30 ENCOUNTER — LAB (OUTPATIENT)
Dept: LAB | Facility: CLINIC | Age: 26
End: 2021-07-30
Payer: COMMERCIAL

## 2021-07-30 VITALS — SYSTOLIC BLOOD PRESSURE: 102 MMHG | BODY MASS INDEX: 25.39 KG/M2 | WEIGHT: 167 LBS | DIASTOLIC BLOOD PRESSURE: 60 MMHG

## 2021-07-30 DIAGNOSIS — Z36.9 ENCOUNTER FOR ANTENATAL SCREENING OF MOTHER: ICD-10-CM

## 2021-07-30 DIAGNOSIS — O09.92 SUPERVISION OF HIGH RISK PREGNANCY IN SECOND TRIMESTER: Primary | ICD-10-CM

## 2021-07-30 DIAGNOSIS — Z23 NEED FOR TDAP VACCINATION: ICD-10-CM

## 2021-07-30 LAB
ERYTHROCYTE [DISTWIDTH] IN BLOOD BY AUTOMATED COUNT: 14 % (ref 10–15)
GLUCOSE 1H P 50 G GLC PO SERPL-MCNC: 92 MG/DL (ref 70–129)
HCT VFR BLD AUTO: 29.2 % (ref 35–47)
HGB BLD-MCNC: 9.9 G/DL (ref 11.7–15.7)
MCH RBC QN AUTO: 26.3 PG (ref 26.5–33)
MCHC RBC AUTO-ENTMCNC: 33.9 G/DL (ref 31.5–36.5)
MCV RBC AUTO: 78 FL (ref 78–100)
PLATELET # BLD AUTO: 312 10E3/UL (ref 150–450)
RBC # BLD AUTO: 3.77 10E6/UL (ref 3.8–5.2)
WBC # BLD AUTO: 10 10E3/UL (ref 4–11)

## 2021-07-30 PROCEDURE — 85027 COMPLETE CBC AUTOMATED: CPT

## 2021-07-30 PROCEDURE — 90715 TDAP VACCINE 7 YRS/> IM: CPT | Performed by: OBSTETRICS & GYNECOLOGY

## 2021-07-30 PROCEDURE — 82952 GTT-ADDED SAMPLES: CPT

## 2021-07-30 PROCEDURE — 90471 IMMUNIZATION ADMIN: CPT | Performed by: OBSTETRICS & GYNECOLOGY

## 2021-07-30 PROCEDURE — 99207 PR PRENATAL VISIT: CPT | Performed by: OBSTETRICS & GYNECOLOGY

## 2021-07-30 PROCEDURE — 36415 COLL VENOUS BLD VENIPUNCTURE: CPT

## 2021-07-30 RX ORDER — FERROUS SULFATE 325(65) MG
325 TABLET ORAL
Qty: 90 TABLET | Refills: 3 | Status: ON HOLD | OUTPATIENT
Start: 2021-07-30 | End: 2021-10-16

## 2021-07-30 NOTE — PROGRESS NOTES
HSV proph 36 weeks  Cbc and glucose today  Discussed when to call and pre-term labor  tdap today  No vaginal bleeding  return to clinic 2 weeks

## 2021-07-30 NOTE — NURSING NOTE
Prior to immunization administration, verified patients identity using patient s name and date of birth. Please see Immunization Activity for additional information.     Screening Questionnaire for Adult Immunization    Are you sick today?   No   Do you have allergies to medications, food, a vaccine component or latex?   No   Have you ever had a serious reaction after receiving a vaccination?   No   Do you have a long-term health problem with heart, lung, kidney, or metabolic disease (e.g., diabetes), asthma, a blood disorder, no spleen, complement component deficiency, a cochlear implant, or a spinal fluid leak?  Are you on long-term aspirin therapy?   No   Do you have cancer, leukemia, HIV/AIDS, or any other immune system problem?   No   Do you have a parent, brother, or sister with an immune system problem?   No   In the past 3 months, have you taken medications that affect  your immune system, such as prednisone, other steroids, or anticancer drugs; drugs for the treatment of rheumatoid arthritis, Crohn s disease, or psoriasis; or have you had radiation treatments?   No   Have you had a seizure, or a brain or other nervous system problem?   No   During the past year, have you received a transfusion of blood or blood    products, or been given immune (gamma) globulin or antiviral drug?   No   For women: Are you pregnant or is there a chance you could become       pregnant during the next month? yes   Have you received any vaccinations in the past 4 weeks?   No     Immunization questionnaire answers were all negative.        Per orders of Dr. Akhtar, injection of Tdap given by Lacy Morejon CMA. Patient instructed to remain in clinic for 15 minutes afterwards, and to report any adverse reaction to me immediately.       Screening performed by Lacy Morejon CMA on 7/30/2021 at 11:01 AM.

## 2021-08-11 NOTE — PROGRESS NOTES
Feels well, main concern is intermittent pelvic pressure and tightening. Denies cramping/contractions, denies vaginal symptoms and s/sx of UTI.   Has seen midwives and Dr. Akhtar. Plans to see midwives for her care but states keeps mistakenly getting scheduled with Dr. Akhtar. Reviewed CNMs and MDs are separate, make sure she is scheduling with us if planning to deliver with CNMs.   Fetal Movement: positive, denies loss of fluid/vb, some pressure and BH  Fetal kick counts reviewed and handout given  Reviewed PTL precautions and s/sx of preeclampsia; denies any S&S and aware of what to report  Wet prep, gram stain, yeast culture, UA/UC collected d/t pelvic pressure.     Return to clinic in 2 weeks    ALIYA Denny, OSWALDOM

## 2021-08-11 NOTE — PATIENT INSTRUCTIONS
SIGNS OF  LABOR    Labor is  if it happens more than three weeks before your due date.    It can be hard to know if you are in labor, since the symptoms can be like the normal feelings of pregnancy.  Often, the only difference is the symptoms increase or they don't go away.     Signs of  labor can include:      Contractions which can feel like period cramps or gas pain.  You may feel it in the lower part of your abdomen, in your back, or as a pressure feeling in your bottom.  It is often regular, coming every 5 or 10 minutes, and  lasting about 30-60 seconds. Some contractions are normal during pregnancy (Churchill damian contractions) but if you are feeling more than 5-6 in one hour that is NOT normal    If this occurs empty your bladder, then drink 2-3 glasses of water, eat a snack, and lay down on your left side. Put your hand on your abdomen to count the contractions.  If after one hour of resting you have still had 5-6 contractions call your clinic right away.      If you feel a pop, gush, or trickle of fluid it may mean that your bag of water has broken and you should contact the clinic       You may also experience loose stools and/or rectal pressure       Listen to your body, if something doesn't seem right please call us at the clinic    Risk Factors      Previous  delivery    Bacterial Vaginosis- if you notice a fishy smell to your discharge or experience vaginal itching/discomfort you should be evaluated for infection    Smoking    Drug abuse    Adolescent (teen) pregnancy or advanced maternal age (AMA) age 35 and over    Dehydration (this may not cause  labor but it can cause contractions)    If you think you are in  labor we may do some lab testing in the clinic or send you to the hospital for evaluation    Please call us if you are concerned you are in  labor.    [facility]  [phone]      PREECLAMPSIA SIGNS AND  "SYMPTOMS    Preeclampsia is a dangerous condition that some women develop in the second half of pregnancy. It can also begin after the baby is born.  Preeclampsia causes high blood pressure and can cause problems with many organ systems in your body.  It can also affect the growth of your baby. The exact cause of preeclampsia is unknown, however, there are signs and symptoms to watch for:    -A bad headache that doesn't improve with Tylenol  -Visual changes such as spots, flashes of light, blurry vision  -Pain in the upper right part of your abdomen, especially under the ribs that doesn't go away  -Nausea and/or vomiting  -Feeling extremely tired  -Yellowing of the skin and/or eyes  -Feeling \"not quite right\" or that something is wrong  -An extreme amount of swelling (some swelling in pregnancy is very normal)    If your midwife feels that you are developing preeclampsia, you will have lab tests drawn and will be monitored very closely.     If you are experiencing anyof these symptoms, call the [facility] immediately at [phone].    Fetal Kick Counts    It is important to know when your baby's movements occur. We often get busy with work and life and do not pay close attention to their movements.        Women typically begin feeling movement between 18-22 weeks of gestation, sometimes it can be earlier or later depending on where your placenta is       Movements usually begin feeling like popping or fluttering and as the baby grows they become more pronounced    Toward the end of pregnancy as the baby gets larger they may not move as much or make as big of movements. Babies have maturing sleep cycles as well as not as much room to move and flip. If you are ever concerned about your baby's movements or have not felt the baby move for a while, we recommend you do a fetal kick count. Prior to starting your count drink a glass of water or juice and eat a snack. Then lay down on your side and " begin to count movements.     How to do a Fetal Kick Counts    There are many different ways to monitor your baby's movements. Movements can range from large jabs to small kicks, or wiggles.  Hiccups count!      Count 10 movements in 2 hours when resting and focusing    Count 10 movements in 12 hours when doing normal activity    We recommend that if movements occur but seem decreased that you should be seen in the clinic or hospital for evaluation within 12 hours. If fetal movement is absent or fetal kick counts are low please contact us right away.    If you ever have any concerns about your baby's movements DO NOT HESITATE to call us, we are here for you!    [facility]  [phone]

## 2021-08-13 ENCOUNTER — PRENATAL OFFICE VISIT (OUTPATIENT)
Dept: MIDWIFE SERVICES | Facility: CLINIC | Age: 26
End: 2021-08-13
Payer: COMMERCIAL

## 2021-08-13 VITALS — BODY MASS INDEX: 25.5 KG/M2 | WEIGHT: 167.7 LBS | SYSTOLIC BLOOD PRESSURE: 102 MMHG | DIASTOLIC BLOOD PRESSURE: 58 MMHG

## 2021-08-13 DIAGNOSIS — Z3A.30 30 WEEKS GESTATION OF PREGNANCY: ICD-10-CM

## 2021-08-13 DIAGNOSIS — R10.2 PELVIC PRESSURE IN PREGNANCY: ICD-10-CM

## 2021-08-13 DIAGNOSIS — R76.8 HSV-2 SEROPOSITIVE: ICD-10-CM

## 2021-08-13 DIAGNOSIS — O09.93 SUPERVISION OF HIGH RISK PREGNANCY IN THIRD TRIMESTER: Primary | ICD-10-CM

## 2021-08-13 DIAGNOSIS — O26.899 PELVIC PRESSURE IN PREGNANCY: ICD-10-CM

## 2021-08-13 DIAGNOSIS — Z87.59 HISTORY OF POSTPARTUM DEPRESSION: ICD-10-CM

## 2021-08-13 DIAGNOSIS — Z86.59 HISTORY OF POSTPARTUM DEPRESSION: ICD-10-CM

## 2021-08-13 DIAGNOSIS — J45.20 MILD INTERMITTENT ASTHMA WITHOUT COMPLICATION: ICD-10-CM

## 2021-08-13 LAB
ALBUMIN UR-MCNC: NEGATIVE MG/DL
APPEARANCE UR: CLEAR
BACTERIA #/AREA URNS HPF: ABNORMAL /HPF
BILIRUB UR QL STRIP: NEGATIVE
CLUE CELLS: ABNORMAL
COLOR UR AUTO: YELLOW
GLUCOSE UR STRIP-MCNC: NEGATIVE MG/DL
HGB UR QL STRIP: NEGATIVE
KETONES UR STRIP-MCNC: 15 MG/DL
LEUKOCYTE ESTERASE UR QL STRIP: ABNORMAL
MUCOUS THREADS #/AREA URNS LPF: PRESENT /LPF
NITRATE UR QL: NEGATIVE
NUGENT SCORE: 1
PH UR STRIP: 6 [PH] (ref 5–7)
RBC #/AREA URNS AUTO: ABNORMAL /HPF
SP GR UR STRIP: 1.02 (ref 1–1.03)
SQUAMOUS #/AREA URNS AUTO: ABNORMAL /LPF
TRICHOMONAS, WET PREP: ABNORMAL
UROBILINOGEN UR STRIP-ACNC: 0.2 E.U./DL
WBC #/AREA URNS AUTO: ABNORMAL /HPF
WBC'S/HIGH POWER FIELD, WET PREP: ABNORMAL
WHITE BLOOD CELLS: NORMAL
YEAST, WET PREP: ABNORMAL

## 2021-08-13 PROCEDURE — 87210 SMEAR WET MOUNT SALINE/INK: CPT | Performed by: ADVANCED PRACTICE MIDWIFE

## 2021-08-13 PROCEDURE — 81001 URINALYSIS AUTO W/SCOPE: CPT | Performed by: ADVANCED PRACTICE MIDWIFE

## 2021-08-13 PROCEDURE — 87205 SMEAR GRAM STAIN: CPT | Performed by: ADVANCED PRACTICE MIDWIFE

## 2021-08-13 PROCEDURE — 99207 PR PRENATAL VISIT: CPT | Performed by: ADVANCED PRACTICE MIDWIFE

## 2021-08-13 PROCEDURE — 87086 URINE CULTURE/COLONY COUNT: CPT | Performed by: ADVANCED PRACTICE MIDWIFE

## 2021-08-13 PROCEDURE — 87102 FUNGUS ISOLATION CULTURE: CPT | Performed by: ADVANCED PRACTICE MIDWIFE

## 2021-08-14 LAB — BACTERIA UR CULT: NORMAL

## 2021-08-17 LAB — BACTERIA VAG AEROBE CULT: NORMAL

## 2021-08-26 NOTE — PROGRESS NOTES
"Feels OK, states that overall she is doing well.  She states that she is getting nervous about developing PP depression again.  She states that after she had Celestine in 2013, she had \"really bad\" PP depression, states that she cried every day and felt like she couldn't care for herself or her son, was living alone at the time.  She went to her primary care provider, was dx with PP depression.  She states that she was not started on any medication and did not go to therapy, but was eventually able to get better.  She states that she does not want that to happen again.  She is currently living with S.OBetsy Schuster and her son Celestine, would like to have support established prior to giving birth.   Fetal Movement: positive, denies loss of fluid/vb, no contractions  Fetal kick counts/movement reviewed  Reviewed PTL precautions and s/sx of preeclampsia; denies any S&S and aware of what to report     Peds chosen: Yes  Pediatrician: Hep B, Vit K, Erythromycin and circumcision; advised to check insurance for hospital vs clinic    Plans to breastfeed unsure, states that she was not able to BF her 1st child, is open to try again, but may opt for formula feeding.   Referral placed for MH: psychiatry and counseling.  PPSM resource card given also.   Encouraged to reach out if any worsening signs and symptoms of depression     Return to clinic 2 weeks    Elaine PISANO CNM, [facility]  [phone]    **consider CBC/Hgb at next visit, check mood and on MH referral  "

## 2021-08-26 NOTE — PATIENT INSTRUCTIONS

## 2021-08-27 ENCOUNTER — PRENATAL OFFICE VISIT (OUTPATIENT)
Dept: MIDWIFE SERVICES | Facility: CLINIC | Age: 26
End: 2021-08-27
Payer: COMMERCIAL

## 2021-08-27 VITALS — SYSTOLIC BLOOD PRESSURE: 102 MMHG | BODY MASS INDEX: 26.26 KG/M2 | WEIGHT: 172.7 LBS | DIASTOLIC BLOOD PRESSURE: 60 MMHG

## 2021-08-27 DIAGNOSIS — Z3A.32 32 WEEKS GESTATION OF PREGNANCY: ICD-10-CM

## 2021-08-27 DIAGNOSIS — Z87.59 HISTORY OF POSTPARTUM DEPRESSION: ICD-10-CM

## 2021-08-27 DIAGNOSIS — R76.8 HSV-2 SEROPOSITIVE: ICD-10-CM

## 2021-08-27 DIAGNOSIS — Z86.59 HISTORY OF POSTPARTUM DEPRESSION: ICD-10-CM

## 2021-08-27 DIAGNOSIS — O09.93 SUPERVISION OF HIGH RISK PREGNANCY IN THIRD TRIMESTER: Primary | ICD-10-CM

## 2021-08-27 DIAGNOSIS — O99.013 ANEMIA AFFECTING PREGNANCY IN THIRD TRIMESTER: ICD-10-CM

## 2021-08-27 PROCEDURE — 99207 PR PRENATAL VISIT: CPT | Performed by: NURSE PRACTITIONER

## 2021-09-06 ENCOUNTER — NURSE TRIAGE (OUTPATIENT)
Dept: NURSING | Facility: CLINIC | Age: 26
End: 2021-09-06

## 2021-09-06 NOTE — TELEPHONE ENCOUNTER
"Pt is 34 weeks pregnant, due 10/17/2021. Pt is calling in about having continuous pelvic pain that started this morning. Pt rates pain \"7\". Pt reports no injury, but she was more active than normal this morning. Pt reports she is having trouble moving around now because it is so painful, and it does not feel like contractions. Pt reports her baby is moving normal, and there is no bleeding, or fluids leaking from her vagina. Pt denies any vomiting.  Care advice given, an per protocol pt should be evaluated in labor and delivery. Pt verbalized understanding, and will get a ride to the hospital. Pt was advised to call back if symptoms worsen, or if she has further concerns.     Arnol Zhang RN on 9/6/2021 at 2:07 PM      Reason for Disposition    [1] Pelvic pain AND [2] pregnant > 20 weeks    MODERATE-SEVERE abdominal pain (e.g., interferes with normal activities, awakens from sleep)    Additional Information    Negative: Shock suspected (e.g., cold/pale/clammy skin, too weak to stand, low BP, rapid pulse)    Negative: Passed out (i.e., lost consciousness, collapsed and was not responding)    Negative: Sounds like a life-threatening emergency to the triager    Negative: Passed out (i.e., lost consciousness, collapsed and was not responding)    Negative: Shock suspected (e.g., cold/pale/clammy skin, too weak to stand, low BP, rapid pulse)    Negative: Difficult to awaken or acting confused (e.g., disoriented, slurred speech)    Negative: [1] SEVERE abdominal pain (e.g., excruciating) AND [2] constant AND [3] present > 1 hour    Negative: SEVERE vaginal bleeding (e.g., continuous red blood from vagina, large blood clots)    Negative: Sounds like a life-threatening emergency to the triager    Negative: Followed an abdomen (stomach) injury    Negative: [1] Having contractions or other symptoms of labor (such as vaginal pressure) AND [2] >= 37 weeks pregnant (i.e., term pregnancy)    Negative: [1] Having contractions " "or other symptoms of labor (such as vaginal pressure) AND [2] < 37 weeks pregnant (i.e., )    Negative: [1] Abdominal pain AND [2] pregnant < 20 weeks    Negative: [1] Vomiting AND [2] contains red blood or black (\"coffee ground\") material  (Exception: few red streaks in vomit that only happened once)    Protocols used: PELVIC PAIN - FEMALE-A-AH, PREGNANCY - ABDOMINAL PAIN GREATER THAN 20 WEEKS EGA-A-AH      "

## 2021-09-13 ENCOUNTER — PRENATAL OFFICE VISIT (OUTPATIENT)
Dept: MIDWIFE SERVICES | Facility: CLINIC | Age: 26
End: 2021-09-13
Payer: COMMERCIAL

## 2021-09-13 VITALS — DIASTOLIC BLOOD PRESSURE: 68 MMHG | BODY MASS INDEX: 26.41 KG/M2 | SYSTOLIC BLOOD PRESSURE: 126 MMHG | WEIGHT: 173.7 LBS

## 2021-09-13 DIAGNOSIS — R76.8 HSV-2 SEROPOSITIVE: ICD-10-CM

## 2021-09-13 DIAGNOSIS — O09.93 SUPERVISION OF HIGH RISK PREGNANCY IN THIRD TRIMESTER: Primary | ICD-10-CM

## 2021-09-13 PROCEDURE — 99207 PR PRENATAL VISIT: CPT | Performed by: ADVANCED PRACTICE MIDWIFE

## 2021-09-13 RX ORDER — VALACYCLOVIR HYDROCHLORIDE 1 G/1
1000 TABLET, FILM COATED ORAL DAILY
Qty: 40 TABLET | Refills: 0 | Status: ON HOLD | OUTPATIENT
Start: 2021-09-13 | End: 2021-10-16

## 2021-09-13 NOTE — PATIENT INSTRUCTIONS
GROUP B STREP    Group B Strep (GBS) is a common bacteria that is sometimes found in the vagina, urinary tract or rectum.  It is not harmful typically to adults but can cause serious illness in newborns.  It occasionally is passed from mother to baby during birth.   It is important to test in pregnancy.  When a woman is found to be positive for GBS, either at the first prenatal visit or by taking a culture at 36 weeks, treatment will be offered to reduce the chance of spreading the bacteria to the baby.       Treatment consists of either oral antibiotics early in pregnancy or antibiotics given by IV during labor if testing is positive at 36 weeks.      Even without treatment the baby rarely (1-2% of the time) gets infected.  With treatment the baby almost never gets infected.       There really isn't anything you can do to keep from getting or being positive for GBS.  It isn't sexually transmitted and there are no symptoms if you are positive.       Your midwife will discuss your results with you and make recommendations for treatment.  Fetal Kick Counts    It is important to know when your baby's movements occur. We often get busy with work and life and do not pay close attention to their movements.        Women typically begin feeling movement between 18-22 weeks of gestation, sometimes it can be earlier or later depending on where your placenta is       Movements usually begin feeling like popping or fluttering and as the baby grows they become more pronounced    Toward the end of pregnancy as the baby gets larger they may not move as much or make as big of movements. Babies have maturing sleep cycles as well as not as much room to move and flip. If you are ever concerned about your baby's movements or have not felt the baby move for a while, we recommend you do a fetal kick count. Prior to starting your count drink a glass of water or juice and eat a snack. Then lay down on your side and begin to count  movements.     How to do a Fetal Kick Counts    There are many different ways to monitor your baby's movements. Movements can range from large jabs to small kicks, or wiggles.  Hiccups count!      Count 10 movements in 2 hours when resting and focusing    Count 10 movements in 12 hours when doing normal activity    We recommend that if movements occur but seem decreased that you should be seen in the clinic or hospital for evaluation within 12 hours. If fetal movement is absent or fetal kick counts are low please contact us right away.    If you ever have any concerns about your baby's movements DO NOT HESITATE to call us, we are here for you!    [facility]  [phone]

## 2021-09-13 NOTE — PROGRESS NOTES
Feels ok.   Had some sharp right sided abdominal pain this morning for about an hour.  Able to go back to sleep.  No bowel or bladder changes or issues  Fetal Movement: positive, denies loss of fluid/vb, no  contractions  Fetal kick counts/movement reviewed  RX for valtrex due to HSV hx .  Reviewed PTL precautions and s/sx of preeclampsia; denies any S&S and aware of what to report     Taking hospital tour?  incertain  Have car seat Yes  Discussed GBS/cx check at next visit  Return to clinic 2 weeks

## 2021-09-19 ENCOUNTER — HEALTH MAINTENANCE LETTER (OUTPATIENT)
Age: 26
End: 2021-09-19

## 2021-09-22 NOTE — PATIENT INSTRUCTIONS
"Labor Instructions for Midwife Patients    When to call:  Both during and after office hours call  [phone]. There is a triage RN to take your calls and answer your questions 24 hours a day.  If she cannot answer your question she will page the midwife on call for you.    When to call:  Call anytime you have important concerns about you or your baby.     Call if:    You are having contractions at regular intervals about 5-6 minutes apart lasting 30-60 seconds and becoming increasingly more intense     You have an uncontrollable gush of fluid from your vagina or feel a pop and gush like your water has broken    You have HEAVY bleeding, like heavy period, blood running down your legs, or  soaking a pad.     Some bleeding after a pelvic exam, after intercourse, or in labor when your cervix is dilating is normal and is referred to as \"bloody show\"    You have severe, continuous back or abdominal pain    You feel it is time to go to the hospital    If this is your first labor, call when contractions are very intense and have been about every 3-4 minutes for about an hour    If it is your second labor or more, call when contractions are strong and about every 3-5 minutes or sooner depending on your level of discomfort.     Keep in mind we are always here for you! If you have questions, concerns please don't hesitate to call us.     What to eat/drink in labor: Drink plenty of fluid (water most importantly, juice, soda or tea without caffeine). Eat rice, pasta, soup, cereal, bread/toast, and fruit. Avoid dairy and greasy food as they are difficult to digest and you may experience some nausea during labor.    Comfort measures:    Baths and showers (ok even with ruptured membranes, it may temporarily slow contractions if you are still in the early stage of labor)    Warm/hot packs for back pain or discomfort    Back, belly, or thigh massages    Standing, rocking, walking, leaning over bed or tables, side-lying and " sleeping    Miscellaneous:     Contractions are timed from the beginning of one to the beginning of the next    Try hard to sleep during the early stage of labor when you are not that uncomfortable. Timing of contractions at this point is not important    Even if you cannot sleep, resting in bed or on the couch can help you maintain your energy for labor    When you arrive at the hospital the nurse will check your baby's heartbeat, check your cervix, and will call us. The midwife on call will come in and be with you when you are in active labor    After hours you need to enter the hospital through the emergency room        NATURAL LABOR PREPARATION    So, you're getting closer and closer to your due date and wondering what you can do to help get your body ready for labor.   Here are some natural methods you can try:    NOTE: DO NOT begin any of the following prior to 36 completed weeks of pregnancy!    Evening Primrose Oil: Take 1000mg by mouth three times per day. This encourages the cervix to ripen. Cervical ripening is when your cervix becomes more soft and stretchy to get ready for dilation and effacement.     Red Raspberry Leaf Tea: Red raspberry tea (get it at a Co-op or in the grocery store). Drink three cups per day (hot or cold). This can help to prepare the uterine muscles for labor.      Fetal Kick Counts    It is important to know when your baby's movements occur. We often get busy with work and life and do not pay close attention to their movements.        Women typically begin feeling movement between 18-22 weeks of gestation, sometimes it can be earlier or later depending on where your placenta is       Movements usually begin feeling like popping or fluttering and as the baby grows they become more pronounced    Toward the end of pregnancy as the baby gets larger they may not move as much or make as big of movements. Babies have maturing sleep cycles as well as not as much room to move and flip. If  "you are ever concerned about your baby's movements or have not felt the baby move for a while, we recommend you do a fetal kick count. Prior to starting your count drink a glass of water or juice and eat a snack. Then lay down on your side and begin to count movements.       PREECLAMPSIA SIGNS AND SYMPTOMS    Preeclampsia is a dangerous condition that some women develop in the second half of pregnancy. It can also begin after the baby is born.  Preeclampsia causes high blood pressure and can cause problems with many organ systems in your body.  It can also affect the growth of your baby. The exact cause of preeclampsia is unknown, however, there are signs and symptoms to watch for:    -A bad headache that doesn't improve with Tylenol  -Visual changes such as spots, flashes of light, blurry vision  -Pain in the upper right part of your abdomen, especially under the ribs that doesn't go away  -Nausea and/or vomiting  -Feeling extremely tired  -Yellowing of the skin and/or eyes  -Feeling \"not quite right\" or that something is wrong  -An extreme amount of swelling (some swelling in pregnancy is very normal)    If your midwife feels that you are developing preeclampsia, you will have lab tests drawn and will be monitored very closely.     If you are experiencing anyof these symptoms, call the [facility] immediately at [phone].        "

## 2021-09-22 NOTE — PROGRESS NOTES
Feels well  HSV prophylaxis: Has not been taking. Does not see a reason to, she has never had an outbreak.  Discussed the recommendations and reasons for taking prophylaxis. Encouraged to begin daily suppression. Aware if she has an active lesion(s) during birth the recommendation is a C/S  Fetal movement: positive  Denies bleeding/lof, no contractions  GBS swab done today  Vaginal exam done, confirmed vertex via leopolds or bedside US  Cx:  Deferred   Labor instructions given  Labor preferences/birth plan reviewed: Nothing specific  Unsure if she is planning to breastfeed. Did not breastfeed with her first baby.   Discussed option to try on PP and she can be sent home with a pump if she desires to continue    Warning signs reviewed  Patient denies S&S of pre-eclampsia and aware of what to report     Return to clinic 1 week    Seema PISANO CNM

## 2021-09-24 ENCOUNTER — PRENATAL OFFICE VISIT (OUTPATIENT)
Dept: MIDWIFE SERVICES | Facility: CLINIC | Age: 26
End: 2021-09-24
Payer: COMMERCIAL

## 2021-09-24 VITALS — WEIGHT: 174 LBS | BODY MASS INDEX: 26.46 KG/M2 | SYSTOLIC BLOOD PRESSURE: 106 MMHG | DIASTOLIC BLOOD PRESSURE: 68 MMHG

## 2021-09-24 DIAGNOSIS — Z23 NEED FOR PROPHYLACTIC VACCINATION AND INOCULATION AGAINST INFLUENZA: ICD-10-CM

## 2021-09-24 DIAGNOSIS — O09.93 SUPERVISION OF HIGH RISK PREGNANCY IN THIRD TRIMESTER: Primary | ICD-10-CM

## 2021-09-24 PROCEDURE — 87653 STREP B DNA AMP PROBE: CPT | Performed by: ADVANCED PRACTICE MIDWIFE

## 2021-09-24 PROCEDURE — 99207 PR PRENATAL VISIT: CPT | Performed by: ADVANCED PRACTICE MIDWIFE

## 2021-09-24 PROCEDURE — 90686 IIV4 VACC NO PRSV 0.5 ML IM: CPT | Performed by: ADVANCED PRACTICE MIDWIFE

## 2021-09-24 PROCEDURE — 90471 IMMUNIZATION ADMIN: CPT | Performed by: ADVANCED PRACTICE MIDWIFE

## 2021-09-25 LAB
GP B STREP DNA SPEC QL NAA+PROBE: NEGATIVE
PATIENT PENICILLIN, AMOXICILLIN, CEPHALOSPORINS ALLERGY: NO

## 2021-09-29 NOTE — PROGRESS NOTES
Feels well overall, ready to be done.   Taking HSV prophylaxis   Taking oral iron   Fetal movement: positive  Denies vaginal bleeding/lof, some irregular contractions  Warning signs reviewed  Labor signs and symptoms discussed, aware of numbers to call. Labor instructions given   Denies s/sx of preeclampsia and aware of what to report  Plans for birth control after baby: unsure   Return to clinic 1 week    ALIYA Denny, CNM

## 2021-09-29 NOTE — PATIENT INSTRUCTIONS
"Labor Instructions for Midwife Patients    When to call:  Both during and after office hours call  [phone]. There is a triage RN to take your calls and answer your questions 24 hours a day.  If they cannot answer your question they will page the midwife on call for you.    When to call:  Call anytime you have important concerns about you or your baby.     Call if:    You are having contractions at regular intervals about 5-6 minutes apart lasting 30-60 seconds and becoming increasingly more intense     You have an uncontrollable gush of fluid from your vagina or feel a pop and gush like your water has broken    You have HEAVY bleeding, like heavy period, blood running down your legs, or  soaking a pad.     Some bleeding after a pelvic exam, after intercourse, or in labor when your cervix is dilating is normal and is referred to as \"bloody show\"    You have severe, continuous back or abdominal pain    You feel it is time to go to the hospital    If this is your first labor, call when contractions are very intense and have been about every 3-4 minutes for about an hour    If it is your second labor or more, call when contractions are strong and about every 3-5 minutes or sooner depending on your level of discomfort.     Keep in mind we are always here for you! If you have questions, concerns please don't hesitate to call us.     What to eat/drink in labor: Drink plenty of fluid (water most importantly, juice, soda or tea without caffeine). Eat rice, pasta, soup, cereal, bread/toast, and fruit. Avoid dairy and greasy food as they are difficult to digest and you may experience some nausea during labor.    Comfort measures:    Baths and showers (ok even with ruptured membranes, it may temporarily slow contractions if you are still in the early stage of labor)    Warm/hot packs for back pain or discomfort    Back, belly, or thigh massages    Standing, rocking, walking, leaning over bed or tables, side-lying and " "sleeping    Miscellaneous:     Contractions are timed from the beginning of one to the beginning of the next    Try hard to sleep during the early stage of labor when you are not that uncomfortable. Timing of contractions at this point is not important    Even if you cannot sleep, resting in bed or on the couch can help you maintain your energy for labor    When you arrive at the hospital the nurse will check your baby's heartbeat, check your cervix, and will call us. The midwife on call will come in and be with you when you are in active labor    After hours you need to enter the hospital through the emergency room    PREECLAMPSIA SIGNS AND SYMPTOMS    Preeclampsia is a dangerous condition that some women develop in the second half of pregnancy. It can also begin after the baby is born.  Preeclampsia causes high blood pressure and can cause problems with many organ systems in your body.  It can also affect the growth of your baby. The exact cause of preeclampsia is unknown, however, there are signs and symptoms to watch for:    -A bad headache that doesn't improve with Tylenol  -Visual changes such as spots, flashes of light, blurry vision  -Pain in the upper right part of your abdomen, especially under the ribs that doesn't go away  -Nausea and/or vomiting  -Feeling extremely tired  -Yellowing of the skin and/or eyes  -Feeling \"not quite right\" or that something is wrong  -An extreme amount of swelling (some swelling in pregnancy is very normal)    If your midwife feels that you are developing preeclampsia, you will have lab tests drawn and will be monitored very closely.     If you are experiencing anyof these symptoms, call the [facility] immediately at [phone].    Fetal Kick Counts    It is important to know when your baby's movements occur. We often get busy with work and life and do not pay close attention to their movements.        Women typically begin feeling movement between " 18-22 weeks of gestation, sometimes it can be earlier or later depending on where your placenta is       Movements usually begin feeling like popping or fluttering and as the baby grows they become more pronounced    Toward the end of pregnancy as the baby gets larger they may not move as much or make as big of movements. Babies have maturing sleep cycles as well as not as much room to move and flip. If you are ever concerned about your baby's movements or have not felt the baby move for a while, we recommend you do a fetal kick count. Prior to starting your count drink a glass of water or juice and eat a snack. Then lay down on your side and begin to count movements.     How to do a Fetal Kick Counts    There are many different ways to monitor your baby's movements. Movements can range from large jabs to small kicks, or wiggles.  Hiccups count!      Count 10 movements in 2 hours when resting and focusing    Count 10 movements in 12 hours when doing normal activity    We recommend that if movements occur but seem decreased that you should be seen in the clinic or hospital for evaluation within 12 hours. If fetal movement is absent or fetal kick counts are low please contact us right away.    If you ever have any concerns about your baby's movements DO NOT HESITATE to call us, we are here for you!    [facility] for [facility]  [phone]

## 2021-09-30 ENCOUNTER — PRENATAL OFFICE VISIT (OUTPATIENT)
Dept: MIDWIFE SERVICES | Facility: CLINIC | Age: 26
End: 2021-09-30
Payer: COMMERCIAL

## 2021-09-30 VITALS — DIASTOLIC BLOOD PRESSURE: 62 MMHG | SYSTOLIC BLOOD PRESSURE: 108 MMHG | BODY MASS INDEX: 26.27 KG/M2 | WEIGHT: 172.8 LBS

## 2021-09-30 DIAGNOSIS — Z87.59 HISTORY OF POSTPARTUM DEPRESSION: ICD-10-CM

## 2021-09-30 DIAGNOSIS — Z3A.37 37 WEEKS GESTATION OF PREGNANCY: ICD-10-CM

## 2021-09-30 DIAGNOSIS — O09.93 SUPERVISION OF HIGH RISK PREGNANCY IN THIRD TRIMESTER: ICD-10-CM

## 2021-09-30 DIAGNOSIS — O99.013 ANEMIA AFFECTING PREGNANCY IN THIRD TRIMESTER: Primary | ICD-10-CM

## 2021-09-30 DIAGNOSIS — R76.8 HSV-2 SEROPOSITIVE: ICD-10-CM

## 2021-09-30 DIAGNOSIS — Z86.59 HISTORY OF POSTPARTUM DEPRESSION: ICD-10-CM

## 2021-09-30 PROCEDURE — 99207 PR PRENATAL VISIT: CPT | Performed by: ADVANCED PRACTICE MIDWIFE

## 2021-10-01 ENCOUNTER — TELEPHONE (OUTPATIENT)
Dept: MIDWIFE SERVICES | Facility: CLINIC | Age: 26
End: 2021-10-01

## 2021-10-01 NOTE — TELEPHONE ENCOUNTER
Type of Paperwork received:  FMLA     Date Rcvd:  09/29/21    Rcvd From (Company name): [employer]    Provider:  MIDWIVES    Placed on Provider Cart Date:  NA    FAXED AND SCANNED 10/1/2021

## 2021-10-04 ENCOUNTER — MYC MEDICAL ADVICE (OUTPATIENT)
Dept: MIDWIFE SERVICES | Facility: CLINIC | Age: 26
End: 2021-10-04

## 2021-10-05 NOTE — TELEPHONE ENCOUNTER
Type of Paperwork received:  fmla     Date Rcvd:  10/05/2021    Rcvd From (Company name): [employer]    Provider:  Midwives    Placed on Provider Cart Date:  10/5/2021

## 2021-10-12 ENCOUNTER — PRENATAL OFFICE VISIT (OUTPATIENT)
Dept: MIDWIFE SERVICES | Facility: CLINIC | Age: 26
End: 2021-10-12
Payer: COMMERCIAL

## 2021-10-12 VITALS — SYSTOLIC BLOOD PRESSURE: 110 MMHG | WEIGHT: 179.4 LBS | DIASTOLIC BLOOD PRESSURE: 64 MMHG | BODY MASS INDEX: 27.28 KG/M2

## 2021-10-12 DIAGNOSIS — O09.93 SUPERVISION OF HIGH RISK PREGNANCY IN THIRD TRIMESTER: Primary | ICD-10-CM

## 2021-10-12 PROCEDURE — 99207 PR PRENATAL VISIT: CPT | Performed by: ADVANCED PRACTICE MIDWIFE

## 2021-10-12 NOTE — NURSING NOTE
"Chief Complaint   Patient presents with     Prenatal Care     39w 0d       Initial /64 (BP Location: Right arm, Cuff Size: Adult Regular)   Wt 81.4 kg (179 lb 6.4 oz)   LMP 2021   Breastfeeding No   BMI 27.28 kg/m   Estimated body mass index is 27.28 kg/m  as calculated from the following:    Height as of 21: 1.727 m (5' 8\").    Weight as of this encounter: 81.4 kg (179 lb 6.4 oz).  BP completed using cuff size: regular    Questioned patient about current smoking habits.  Pt. has never smoked.                 "

## 2021-10-12 NOTE — PROGRESS NOTES
Feels okay  Fainted last week, was taken by ambulance to ER ([facility]) did not stay in the ER, there was a was a 4 hour wait  States waking up,  Felt dizzy, did not see straight. Was throwing up and fainted.Was sitting at the edge of the bed and then fainted by falling down on the edge of the bed. Did not hit abdomen. Came too right before ambulance came. Then was extremely light headed, resolved the next day.   Has not had any other fainting episodes since previous one.  Discussed proper hydration, eating every 2-3 hours. No sudden position changes     HSV prophylaxis: Yes is taking it, but does not like taking   Fetal movement: positive  Denies vaginal bleeding/lof, no contractions  Warning signs reviewed   Labor signs and symptoms discussed  Denies s/sx of preeclampsia and aware of what to report  Discussed BPP at 40, 41, 41.3 weeks.     Return to clinic 1 week for BPP and CNM visit.     Seema PISANO CNM

## 2021-10-12 NOTE — PATIENT INSTRUCTIONS
"Labor Instructions for Midwife Patients    When to call:  Both during and after office hours call  [phone]. There is a triage RN to take your calls and answer your questions 24 hours a day.  If they cannot answer your question they will page the midwife on call for you.    When to call:  Call anytime you have important concerns about you or your baby.     Call if:    You are having contractions at regular intervals about 5-6 minutes apart lasting 30-60 seconds and becoming increasingly more intense     You have an uncontrollable gush of fluid from your vagina or feel a pop and gush like your water has broken    You have HEAVY bleeding, like heavy period, blood running down your legs, or  soaking a pad.     Some bleeding after a pelvic exam, after intercourse, or in labor when your cervix is dilating is normal and is referred to as \"bloody show\"    You have severe, continuous back or abdominal pain    You feel it is time to go to the hospital    If this is your first labor, call when contractions are very intense and have been about every 3-4 minutes for about an hour    If it is your second labor or more, call when contractions are strong and about every 3-5 minutes or sooner depending on your level of discomfort.     Keep in mind we are always here for you! If you have questions, concerns please don't hesitate to call us.     What to eat/drink in labor: Drink plenty of fluid (water most importantly, juice, soda or tea without caffeine). Eat rice, pasta, soup, cereal, bread/toast, and fruit. Avoid dairy and greasy food as they are difficult to digest and you may experience some nausea during labor.    Comfort measures:    Baths and showers (ok even with ruptured membranes, it may temporarily slow contractions if you are still in the early stage of labor)    Warm/hot packs for back pain or discomfort    Back, belly, or thigh massages    Standing, rocking, walking, leaning over bed or tables, side-lying and " sleeping    Miscellaneous:     Contractions are timed from the beginning of one to the beginning of the next    Try hard to sleep during the early stage of labor when you are not that uncomfortable. Timing of contractions at this point is not important    Even if you cannot sleep, resting in bed or on the couch can help you maintain your energy for labor    When you arrive at the hospital the nurse will check your baby's heartbeat, check your cervix, and will call us. The midwife on call will come in and be with you when you are in active labor    After hours you need to enter the hospital through the emergency room              Post Dates Management    If you've gone beyond your due date you are probably thinking when is this baby coming!  Most babies are born on or after their due date so it is nothing to worry about.    If you are pregnant at 40 weeks we will start some increased monitoring to make sure that your baby and the placenta are healthy enough to continue your pregnancy.      We would have you come to the clinic every 3-4 days to be assessed with an ultrasound called a Biophysical profile (BPP).       This tells us how your baby is doing. We monitor fetal movement, breathing patterns, amniotic fluid level, and heart rate.     Research has shown that by 42 weeks gestation the placenta is not always healthy enough to support the pregnancy further and it is better for the baby to be born.  If you are still pregnant by 41 and a half weeks we will discuss induction of labor with you and the different options available.       PREECLAMPSIA SIGNS AND SYMPTOMS    Preeclampsia is a dangerous condition that some women develop in the second half of pregnancy. It can also begin after the baby is born.  Preeclampsia causes high blood pressure and can cause problems with many organ systems in your body.  It can also affect the growth of your baby. The exact cause of preeclampsia is unknown, however, there are signs  "and symptoms to watch for:    -A bad headache that doesn't improve with Tylenol  -Visual changes such as spots, flashes of light, blurry vision  -Pain in the upper right part of your abdomen, especially under the ribs that doesn't go away  -Nausea and/or vomiting  -Feeling extremely tired  -Yellowing of the skin and/or eyes  -Feeling \"not quite right\" or that something is wrong  -An extreme amount of swelling (some swelling in pregnancy is very normal)    If your midwife feels that you are developing preeclampsia, you will have lab tests drawn and will be monitored very closely.     If you are experiencing anyof these symptoms, call the [facility] immediately at [phone].    Fetal Kick Counts    It is important to know when your baby's movements occur. We often get busy with work and life and do not pay close attention to their movements.        Women typically begin feeling movement between 18-22 weeks of gestation, sometimes it can be earlier or later depending on where your placenta is       Movements usually begin feeling like popping or fluttering and as the baby grows they become more pronounced    Toward the end of pregnancy as the baby gets larger they may not move as much or make as big of movements. Babies have maturing sleep cycles as well as not as much room to move and flip. If you are ever concerned about your baby's movements or have not felt the baby move for a while, we recommend you do a fetal kick count. Prior to starting your count drink a glass of water or juice and eat a snack. Then lay down on your side and begin to count movements.     How to do a Fetal Kick Counts    There are many different ways to monitor your baby's movements. Movements can range from large jabs to small kicks, or wiggles.  Hiccups count!      Count 10 movements in 2 hours when resting and focusing    Count 10 movements in 12 hours when doing normal activity    We recommend that if movements occur but " seem decreased that you should be seen in the clinic or hospital for evaluation within 12 hours. If fetal movement is absent or fetal kick counts are low please contact us right away.    If you ever have any concerns about your baby's movements DO NOT HESITATE to call us, we are here for you!    [facility] for [facility]  [phone]

## 2021-10-14 ENCOUNTER — TELEPHONE (OUTPATIENT)
Dept: MIDWIFE SERVICES | Facility: CLINIC | Age: 26
End: 2021-10-14

## 2021-10-14 ENCOUNTER — NURSE TRIAGE (OUTPATIENT)
Dept: NURSING | Facility: CLINIC | Age: 26
End: 2021-10-14

## 2021-10-14 ENCOUNTER — ANESTHESIA EVENT (OUTPATIENT)
Dept: OBGYN | Facility: CLINIC | Age: 26
End: 2021-10-14
Payer: COMMERCIAL

## 2021-10-14 ENCOUNTER — ANESTHESIA (OUTPATIENT)
Dept: OBGYN | Facility: CLINIC | Age: 26
End: 2021-10-14
Payer: COMMERCIAL

## 2021-10-14 ENCOUNTER — HOSPITAL ENCOUNTER (OUTPATIENT)
Facility: CLINIC | Age: 26
Discharge: HOME | End: 2021-10-14
Attending: ADVANCED PRACTICE MIDWIFE | Admitting: ADVANCED PRACTICE MIDWIFE
Payer: COMMERCIAL

## 2021-10-14 ENCOUNTER — HOSPITAL ENCOUNTER (INPATIENT)
Facility: CLINIC | Age: 26
LOS: 2 days | Discharge: HOME HEALTH | End: 2021-10-16
Attending: ADVANCED PRACTICE MIDWIFE | Admitting: ADVANCED PRACTICE MIDWIFE
Payer: COMMERCIAL

## 2021-10-14 VITALS
BODY MASS INDEX: 26.52 KG/M2 | HEIGHT: 68 IN | RESPIRATION RATE: 16 BRPM | DIASTOLIC BLOOD PRESSURE: 73 MMHG | TEMPERATURE: 98.8 F | WEIGHT: 175 LBS | HEART RATE: 96 BPM | SYSTOLIC BLOOD PRESSURE: 128 MMHG

## 2021-10-14 DIAGNOSIS — Z87.59 HISTORY OF POSTPARTUM DEPRESSION: Primary | ICD-10-CM

## 2021-10-14 DIAGNOSIS — I49.3 PVC'S (PREMATURE VENTRICULAR CONTRACTIONS): Primary | ICD-10-CM

## 2021-10-14 DIAGNOSIS — O09.93 SUPERVISION OF HIGH RISK PREGNANCY IN THIRD TRIMESTER: ICD-10-CM

## 2021-10-14 DIAGNOSIS — Z86.59 HISTORY OF POSTPARTUM DEPRESSION: Primary | ICD-10-CM

## 2021-10-14 PROBLEM — Z36.89 ENCOUNTER FOR TRIAGE IN PREGNANT PATIENT: Status: ACTIVE | Noted: 2021-10-14

## 2021-10-14 LAB
ABO/RH(D): NORMAL
ANTIBODY SCREEN: NEGATIVE
SPECIMEN EXPIRATION DATE: NORMAL

## 2021-10-14 PROCEDURE — 59025 FETAL NON-STRESS TEST: CPT

## 2021-10-14 PROCEDURE — 86780 TREPONEMA PALLIDUM: CPT | Performed by: ADVANCED PRACTICE MIDWIFE

## 2021-10-14 PROCEDURE — 120N000001 HC R&B MED SURG/OB

## 2021-10-14 PROCEDURE — 82565 ASSAY OF CREATININE: CPT | Performed by: ADVANCED PRACTICE MIDWIFE

## 2021-10-14 PROCEDURE — G0463 HOSPITAL OUTPT CLINIC VISIT: HCPCS

## 2021-10-14 PROCEDURE — 258N000003 HC RX IP 258 OP 636: Performed by: ANESTHESIOLOGY

## 2021-10-14 PROCEDURE — 85025 COMPLETE CBC W/AUTO DIFF WBC: CPT | Performed by: ADVANCED PRACTICE MIDWIFE

## 2021-10-14 PROCEDURE — C9803 HOPD COVID-19 SPEC COLLECT: HCPCS

## 2021-10-14 PROCEDURE — G0463 HOSPITAL OUTPT CLINIC VISIT: HCPCS | Mod: 25

## 2021-10-14 PROCEDURE — 84450 TRANSFERASE (AST) (SGOT): CPT | Performed by: ADVANCED PRACTICE MIDWIFE

## 2021-10-14 PROCEDURE — 86900 BLOOD TYPING SEROLOGIC ABO: CPT | Performed by: ADVANCED PRACTICE MIDWIFE

## 2021-10-14 PROCEDURE — 36415 COLL VENOUS BLD VENIPUNCTURE: CPT | Performed by: ADVANCED PRACTICE MIDWIFE

## 2021-10-14 PROCEDURE — 87635 SARS-COV-2 COVID-19 AMP PRB: CPT | Performed by: ADVANCED PRACTICE MIDWIFE

## 2021-10-14 PROCEDURE — 84460 ALANINE AMINO (ALT) (SGPT): CPT | Performed by: ADVANCED PRACTICE MIDWIFE

## 2021-10-14 PROCEDURE — 370N000003 HC ANESTHESIA WARD SERVICE

## 2021-10-14 RX ORDER — ONDANSETRON 4 MG/1
4 TABLET, ORALLY DISINTEGRATING ORAL EVERY 6 HOURS PRN
Status: DISCONTINUED | OUTPATIENT
Start: 2021-10-14 | End: 2021-10-15

## 2021-10-14 RX ORDER — KETOROLAC TROMETHAMINE 30 MG/ML
30 INJECTION, SOLUTION INTRAMUSCULAR; INTRAVENOUS
Status: DISCONTINUED | OUTPATIENT
Start: 2021-10-14 | End: 2021-10-15

## 2021-10-14 RX ORDER — METOCLOPRAMIDE 10 MG/1
10 TABLET ORAL EVERY 6 HOURS PRN
Status: DISCONTINUED | OUTPATIENT
Start: 2021-10-14 | End: 2021-10-15

## 2021-10-14 RX ORDER — ACETAMINOPHEN 325 MG/1
650 TABLET ORAL EVERY 4 HOURS PRN
Status: DISCONTINUED | OUTPATIENT
Start: 2021-10-14 | End: 2021-10-15

## 2021-10-14 RX ORDER — PROCHLORPERAZINE MALEATE 5 MG
10 TABLET ORAL EVERY 6 HOURS PRN
Status: DISCONTINUED | OUTPATIENT
Start: 2021-10-14 | End: 2021-10-14 | Stop reason: HOSPADM

## 2021-10-14 RX ORDER — METHYLERGONOVINE MALEATE 0.2 MG/ML
200 INJECTION INTRAVENOUS
Status: DISCONTINUED | OUTPATIENT
Start: 2021-10-14 | End: 2021-10-15

## 2021-10-14 RX ORDER — ROPIVACAINE HYDROCHLORIDE 2 MG/ML
10 INJECTION, SOLUTION EPIDURAL; INFILTRATION; PERINEURAL ONCE
Status: DISCONTINUED | OUTPATIENT
Start: 2021-10-15 | End: 2021-10-15 | Stop reason: HOSPADM

## 2021-10-14 RX ORDER — METOCLOPRAMIDE HYDROCHLORIDE 5 MG/ML
10 INJECTION INTRAMUSCULAR; INTRAVENOUS EVERY 6 HOURS PRN
Status: DISCONTINUED | OUTPATIENT
Start: 2021-10-14 | End: 2021-10-14 | Stop reason: HOSPADM

## 2021-10-14 RX ORDER — ONDANSETRON 4 MG/1
4 TABLET, ORALLY DISINTEGRATING ORAL EVERY 6 HOURS PRN
Status: DISCONTINUED | OUTPATIENT
Start: 2021-10-14 | End: 2021-10-14 | Stop reason: HOSPADM

## 2021-10-14 RX ORDER — MISOPROSTOL 200 UG/1
400 TABLET ORAL
Status: DISCONTINUED | OUTPATIENT
Start: 2021-10-14 | End: 2021-10-15

## 2021-10-14 RX ORDER — NALOXONE HYDROCHLORIDE 0.4 MG/ML
0.4 INJECTION, SOLUTION INTRAMUSCULAR; INTRAVENOUS; SUBCUTANEOUS
Status: DISCONTINUED | OUTPATIENT
Start: 2021-10-14 | End: 2021-10-15

## 2021-10-14 RX ORDER — HYDROXYZINE PAMOATE 50 MG/1
50-100 CAPSULE ORAL 4 TIMES DAILY PRN
Qty: 30 CAPSULE | Refills: 0 | Status: ON HOLD | OUTPATIENT
Start: 2021-10-14 | End: 2021-10-16

## 2021-10-14 RX ORDER — PROCHLORPERAZINE MALEATE 5 MG
10 TABLET ORAL EVERY 6 HOURS PRN
Status: DISCONTINUED | OUTPATIENT
Start: 2021-10-14 | End: 2021-10-15

## 2021-10-14 RX ORDER — OXYTOCIN 10 [USP'U]/ML
10 INJECTION, SOLUTION INTRAMUSCULAR; INTRAVENOUS
Status: DISCONTINUED | OUTPATIENT
Start: 2021-10-14 | End: 2021-10-15

## 2021-10-14 RX ORDER — PROCHLORPERAZINE 25 MG
25 SUPPOSITORY, RECTAL RECTAL EVERY 12 HOURS PRN
Status: DISCONTINUED | OUTPATIENT
Start: 2021-10-14 | End: 2021-10-15

## 2021-10-14 RX ORDER — ONDANSETRON 2 MG/ML
4 INJECTION INTRAMUSCULAR; INTRAVENOUS EVERY 6 HOURS PRN
Status: DISCONTINUED | OUTPATIENT
Start: 2021-10-14 | End: 2021-10-14 | Stop reason: HOSPADM

## 2021-10-14 RX ORDER — METOCLOPRAMIDE 10 MG/1
10 TABLET ORAL EVERY 6 HOURS PRN
Status: DISCONTINUED | OUTPATIENT
Start: 2021-10-14 | End: 2021-10-14 | Stop reason: HOSPADM

## 2021-10-14 RX ORDER — ONDANSETRON 2 MG/ML
4 INJECTION INTRAMUSCULAR; INTRAVENOUS EVERY 6 HOURS PRN
Status: DISCONTINUED | OUTPATIENT
Start: 2021-10-14 | End: 2021-10-15

## 2021-10-14 RX ORDER — LIDOCAINE 40 MG/G
CREAM TOPICAL
Status: DISCONTINUED | OUTPATIENT
Start: 2021-10-14 | End: 2021-10-15

## 2021-10-14 RX ORDER — CARBOPROST TROMETHAMINE 250 UG/ML
250 INJECTION, SOLUTION INTRAMUSCULAR
Status: DISCONTINUED | OUTPATIENT
Start: 2021-10-14 | End: 2021-10-15

## 2021-10-14 RX ORDER — OXYTOCIN/0.9 % SODIUM CHLORIDE 30/500 ML
340 PLASTIC BAG, INJECTION (ML) INTRAVENOUS CONTINUOUS PRN
Status: DISCONTINUED | OUTPATIENT
Start: 2021-10-14 | End: 2021-10-15

## 2021-10-14 RX ORDER — METOCLOPRAMIDE HYDROCHLORIDE 5 MG/ML
10 INJECTION INTRAMUSCULAR; INTRAVENOUS EVERY 6 HOURS PRN
Status: DISCONTINUED | OUTPATIENT
Start: 2021-10-14 | End: 2021-10-15

## 2021-10-14 RX ORDER — NALOXONE HYDROCHLORIDE 0.4 MG/ML
0.2 INJECTION, SOLUTION INTRAMUSCULAR; INTRAVENOUS; SUBCUTANEOUS
Status: DISCONTINUED | OUTPATIENT
Start: 2021-10-14 | End: 2021-10-15

## 2021-10-14 RX ORDER — FENTANYL CITRATE 50 UG/ML
50-100 INJECTION, SOLUTION INTRAMUSCULAR; INTRAVENOUS
Status: DISCONTINUED | OUTPATIENT
Start: 2021-10-14 | End: 2021-10-15

## 2021-10-14 RX ORDER — PROCHLORPERAZINE 25 MG
25 SUPPOSITORY, RECTAL RECTAL EVERY 12 HOURS PRN
Status: DISCONTINUED | OUTPATIENT
Start: 2021-10-14 | End: 2021-10-14 | Stop reason: HOSPADM

## 2021-10-14 RX ORDER — MISOPROSTOL 200 UG/1
800 TABLET ORAL
Status: DISCONTINUED | OUTPATIENT
Start: 2021-10-14 | End: 2021-10-15

## 2021-10-14 RX ORDER — OXYTOCIN/0.9 % SODIUM CHLORIDE 30/500 ML
100-340 PLASTIC BAG, INJECTION (ML) INTRAVENOUS CONTINUOUS PRN
Status: DISCONTINUED | OUTPATIENT
Start: 2021-10-14 | End: 2021-10-15

## 2021-10-14 RX ORDER — NALBUPHINE HYDROCHLORIDE 10 MG/ML
2.5-5 INJECTION, SOLUTION INTRAMUSCULAR; INTRAVENOUS; SUBCUTANEOUS EVERY 6 HOURS PRN
Status: DISCONTINUED | OUTPATIENT
Start: 2021-10-14 | End: 2021-10-16 | Stop reason: HOSPADM

## 2021-10-14 RX ORDER — FENTANYL CITRATE-0.9 % NACL/PF 10 MCG/ML
100 PLASTIC BAG, INJECTION (ML) INTRAVENOUS EVERY 5 MIN PRN
Status: DISCONTINUED | OUTPATIENT
Start: 2021-10-14 | End: 2021-10-15 | Stop reason: HOSPADM

## 2021-10-14 RX ORDER — TRANEXAMIC ACID 10 MG/ML
1 INJECTION, SOLUTION INTRAVENOUS EVERY 30 MIN PRN
Status: DISCONTINUED | OUTPATIENT
Start: 2021-10-14 | End: 2021-10-15

## 2021-10-14 RX ORDER — IBUPROFEN 600 MG/1
600 TABLET, FILM COATED ORAL
Status: DISCONTINUED | OUTPATIENT
Start: 2021-10-14 | End: 2021-10-16 | Stop reason: HOSPADM

## 2021-10-14 RX ADMIN — SODIUM CHLORIDE, POTASSIUM CHLORIDE, SODIUM LACTATE AND CALCIUM CHLORIDE 1000 ML: 600; 310; 30; 20 INJECTION, SOLUTION INTRAVENOUS at 23:55

## 2021-10-14 ASSESSMENT — MIFFLIN-ST. JEOR
SCORE: 1592.29
SCORE: 1592.29

## 2021-10-14 NOTE — PLAN OF CARE
Pt arrived to labor and delivery for uterine contraction. Ambulating without difficulty. Pt changed to gown. Will evaluate for rule out labor.

## 2021-10-14 NOTE — PLAN OF CARE
Pt is yasmin every 6-8 minutes. VE 1 cm 40% -2. Category 1 tracing. Carmen Hill CNM notified and updated on pt status. Discharge orders received. Reviewed labor precautions with pt all questions answered pt verbalized understanding.

## 2021-10-14 NOTE — DISCHARGE INSTRUCTIONS
Discharge Instruction for Undelivered Patients      You were seen for: uterine contraction.   We Consulted: Carmen Hill  You had (Test or Medicine):NST    Diet: regular     Activity: as tolerated    Call your provider if you notice:  Swelling in your face or increased swelling in your hands or legs.  Headaches that are not relieved by Tylenol (acetaminophen).  Changes in your vision (blurring: seeing spots or stars.)  Nausea (sick to your stomach) and vomiting (throwing up).   Weight gain of 5 pounds or more per week.  Heartburn that doesn't go away.  Signs of bladder infection: pain when you urinate (use the toilet), need to go more often and more urgently.  The bag of naik (rupture of membranes) breaks, or you notice leaking in your underwear.  Bright red blood in your underwear.  Abdominal (lower belly) or stomach pain.  For first baby: Contractions (tightening) less than 5 minutes apart for one hour or more.  Second (plus) baby: Contractions (tightening) less than 10 minutes apart and getting stronger.  *If less than 34 weeks: Contractions (tightening) more than 6 times in one hour.  Increase or change in vaginal discharge (note the color and amount)    Follow-up: as scheduled in clinic

## 2021-10-14 NOTE — PROGRESS NOTES
"[facility] CNM TRIAGE NOTE    Laurence Torres is a 26 year old  with and IUP at 39w2d who presents for r/o labor     Patient states baby is active.  Denies ROM   Denies vaginal bleeding  [facility]     Problems this pregnancy:   1. 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 Asthma   4. Anemia in pregnancy   5. Hx of suicidal ideation    ROS:  Patient is alert and oriented    PHYSICAL EXAM:  /73   Pulse 96   Temp 98.8  F (37.1  C) (Temporal)   Resp 16   Ht 1.727 m (5' 8\")   Wt 79.4 kg (175 lb)   LMP 2021   BMI 26.61 kg/m      FHT's 140 with moderate variability  Accelerations: absent   Decelerations:  absent        Contractions: Pt is yasmin every 6-8 minutes   Abdomen: gravid  Bloody show: small amount pink spotting per pt   Cervix: 1cm/thick/-2 per MAC RN  Membranes are intact       ASSESSMENT :   26 year old  with price IUP 39w2d not in labor  NST  reactive  GBS negative and membranes intact    PLAN:  Discharge home. RN reviewed labor instructions with Laurence, she feels comfortable going home.   Continue routine prenatal care as scheduled next week 10/20    Teaching done r/t to s/s of labor, SROM, decreased fetal movement, comfort measures in third trimester.  Instructed to please refer to the discharge handouts, the RN triage line or on-call CNM for any questions or concerns.  Pt verbalizes understanding and agreement with current plan of care.    ALIYA Denny, CNM    "

## 2021-10-15 LAB
ALT SERPL W P-5'-P-CCNC: 16 U/L (ref 0–50)
AST SERPL W P-5'-P-CCNC: 18 U/L (ref 0–45)
BASOPHILS # BLD AUTO: 0 10E3/UL (ref 0–0.2)
BASOPHILS NFR BLD AUTO: 0 %
CREAT SERPL-MCNC: 0.57 MG/DL (ref 0.52–1.04)
CREAT UR-MCNC: 284 MG/DL
EOSINOPHIL # BLD AUTO: 0.1 10E3/UL (ref 0–0.7)
EOSINOPHIL NFR BLD AUTO: 1 %
ERYTHROCYTE [DISTWIDTH] IN BLOOD BY AUTOMATED COUNT: 15.6 % (ref 10–15)
GFR SERPL CREATININE-BSD FRML MDRD: >90 ML/MIN/1.73M2
HCT VFR BLD AUTO: 34 % (ref 35–47)
HGB BLD-MCNC: 11.6 G/DL (ref 11.7–15.7)
HOLD SPECIMEN: NORMAL
IMM GRANULOCYTES # BLD: 0.1 10E3/UL
IMM GRANULOCYTES NFR BLD: 1 %
LYMPHOCYTES # BLD AUTO: 2.6 10E3/UL (ref 0.8–5.3)
LYMPHOCYTES NFR BLD AUTO: 21 %
MCH RBC QN AUTO: 25.4 PG (ref 26.5–33)
MCHC RBC AUTO-ENTMCNC: 34.1 G/DL (ref 31.5–36.5)
MCV RBC AUTO: 75 FL (ref 78–100)
MONOCYTES # BLD AUTO: 0.9 10E3/UL (ref 0–1.3)
MONOCYTES NFR BLD AUTO: 8 %
NEUTROPHILS # BLD AUTO: 8.3 10E3/UL (ref 1.6–8.3)
NEUTROPHILS NFR BLD AUTO: 69 %
NRBC # BLD AUTO: 0 10E3/UL
NRBC BLD AUTO-RTO: 0 /100
PLATELET # BLD AUTO: 300 10E3/UL (ref 150–450)
PROT UR-MCNC: 0.22 G/L
PROT/CREAT 24H UR: 0.08 G/G CR (ref 0–0.2)
RBC # BLD AUTO: 4.56 10E6/UL (ref 3.8–5.2)
SARS-COV-2 RNA RESP QL NAA+PROBE: NEGATIVE
T PALLIDUM AB SER QL: NONREACTIVE
WBC # BLD AUTO: 12 10E3/UL (ref 4–11)

## 2021-10-15 PROCEDURE — 59400 OBSTETRICAL CARE: CPT | Performed by: ADVANCED PRACTICE MIDWIFE

## 2021-10-15 PROCEDURE — 722N000001 HC LABOR CARE VAGINAL DELIVERY SINGLE

## 2021-10-15 PROCEDURE — 250N000011 HC RX IP 250 OP 636: Performed by: ANESTHESIOLOGY

## 2021-10-15 PROCEDURE — 00HU33Z INSERTION OF INFUSION DEVICE INTO SPINAL CANAL, PERCUTANEOUS APPROACH: ICD-10-PCS | Performed by: ANESTHESIOLOGY

## 2021-10-15 PROCEDURE — 120N000012 HC R&B POSTPARTUM

## 2021-10-15 PROCEDURE — 250N000009 HC RX 250: Performed by: ADVANCED PRACTICE MIDWIFE

## 2021-10-15 PROCEDURE — 3E0R3BZ INTRODUCTION OF ANESTHETIC AGENT INTO SPINAL CANAL, PERCUTANEOUS APPROACH: ICD-10-PCS | Performed by: ANESTHESIOLOGY

## 2021-10-15 PROCEDURE — 250N000009 HC RX 250: Performed by: ANESTHESIOLOGY

## 2021-10-15 PROCEDURE — G0463 HOSPITAL OUTPT CLINIC VISIT: HCPCS

## 2021-10-15 PROCEDURE — 258N000003 HC RX IP 258 OP 636: Performed by: ANESTHESIOLOGY

## 2021-10-15 PROCEDURE — C9803 HOPD COVID-19 SPEC COLLECT: HCPCS

## 2021-10-15 PROCEDURE — 84156 ASSAY OF PROTEIN URINE: CPT | Performed by: ADVANCED PRACTICE MIDWIFE

## 2021-10-15 PROCEDURE — 250N000013 HC RX MED GY IP 250 OP 250 PS 637: Performed by: ADVANCED PRACTICE MIDWIFE

## 2021-10-15 PROCEDURE — 10907ZC DRAINAGE OF AMNIOTIC FLUID, THERAPEUTIC FROM PRODUCTS OF CONCEPTION, VIA NATURAL OR ARTIFICIAL OPENING: ICD-10-PCS | Performed by: ADVANCED PRACTICE MIDWIFE

## 2021-10-15 RX ORDER — OXYTOCIN/0.9 % SODIUM CHLORIDE 30/500 ML
340 PLASTIC BAG, INJECTION (ML) INTRAVENOUS CONTINUOUS PRN
Status: DISCONTINUED | OUTPATIENT
Start: 2021-10-15 | End: 2021-10-16 | Stop reason: HOSPADM

## 2021-10-15 RX ORDER — METHYLERGONOVINE MALEATE 0.2 MG/ML
200 INJECTION INTRAVENOUS
Status: DISCONTINUED | OUTPATIENT
Start: 2021-10-15 | End: 2021-10-16 | Stop reason: HOSPADM

## 2021-10-15 RX ORDER — NALOXONE HYDROCHLORIDE 0.4 MG/ML
0.2 INJECTION, SOLUTION INTRAMUSCULAR; INTRAVENOUS; SUBCUTANEOUS
Status: DISCONTINUED | OUTPATIENT
Start: 2021-10-15 | End: 2021-10-16 | Stop reason: HOSPADM

## 2021-10-15 RX ORDER — MISOPROSTOL 200 UG/1
800 TABLET ORAL
Status: DISCONTINUED | OUTPATIENT
Start: 2021-10-15 | End: 2021-10-16 | Stop reason: HOSPADM

## 2021-10-15 RX ORDER — NALOXONE HYDROCHLORIDE 0.4 MG/ML
0.4 INJECTION, SOLUTION INTRAMUSCULAR; INTRAVENOUS; SUBCUTANEOUS
Status: DISCONTINUED | OUTPATIENT
Start: 2021-10-15 | End: 2021-10-16 | Stop reason: HOSPADM

## 2021-10-15 RX ORDER — MISOPROSTOL 200 UG/1
400 TABLET ORAL
Status: DISCONTINUED | OUTPATIENT
Start: 2021-10-15 | End: 2021-10-16 | Stop reason: HOSPADM

## 2021-10-15 RX ORDER — IBUPROFEN 400 MG/1
800 TABLET, FILM COATED ORAL EVERY 6 HOURS PRN
Status: DISCONTINUED | OUTPATIENT
Start: 2021-10-15 | End: 2021-10-16 | Stop reason: HOSPADM

## 2021-10-15 RX ORDER — HYDROCORTISONE 2.5 %
CREAM (GRAM) TOPICAL 3 TIMES DAILY PRN
Status: DISCONTINUED | OUTPATIENT
Start: 2021-10-15 | End: 2021-10-16 | Stop reason: HOSPADM

## 2021-10-15 RX ORDER — BISACODYL 10 MG
10 SUPPOSITORY, RECTAL RECTAL DAILY PRN
Status: DISCONTINUED | OUTPATIENT
Start: 2021-10-15 | End: 2021-10-16 | Stop reason: HOSPADM

## 2021-10-15 RX ORDER — CARBOPROST TROMETHAMINE 250 UG/ML
250 INJECTION, SOLUTION INTRAMUSCULAR
Status: DISCONTINUED | OUTPATIENT
Start: 2021-10-15 | End: 2021-10-16 | Stop reason: HOSPADM

## 2021-10-15 RX ORDER — SODIUM CHLORIDE, SODIUM LACTATE, POTASSIUM CHLORIDE, CALCIUM CHLORIDE 600; 310; 30; 20 MG/100ML; MG/100ML; MG/100ML; MG/100ML
10-125 INJECTION, SOLUTION INTRAVENOUS CONTINUOUS
Status: DISCONTINUED | OUTPATIENT
Start: 2021-10-15 | End: 2021-10-16 | Stop reason: HOSPADM

## 2021-10-15 RX ORDER — ROPIVACAINE HYDROCHLORIDE 2 MG/ML
INJECTION, SOLUTION EPIDURAL; INFILTRATION; PERINEURAL
Status: COMPLETED | OUTPATIENT
Start: 2021-10-15 | End: 2021-10-15

## 2021-10-15 RX ORDER — TRANEXAMIC ACID 10 MG/ML
1 INJECTION, SOLUTION INTRAVENOUS EVERY 30 MIN PRN
Status: DISCONTINUED | OUTPATIENT
Start: 2021-10-15 | End: 2021-10-16 | Stop reason: HOSPADM

## 2021-10-15 RX ORDER — MODIFIED LANOLIN
OINTMENT (GRAM) TOPICAL
Status: DISCONTINUED | OUTPATIENT
Start: 2021-10-15 | End: 2021-10-16 | Stop reason: HOSPADM

## 2021-10-15 RX ORDER — OXYCODONE HYDROCHLORIDE 5 MG/1
5 TABLET ORAL EVERY 4 HOURS PRN
Status: DISCONTINUED | OUTPATIENT
Start: 2021-10-15 | End: 2021-10-16 | Stop reason: HOSPADM

## 2021-10-15 RX ORDER — ACETAMINOPHEN 325 MG/1
650 TABLET ORAL EVERY 4 HOURS PRN
Status: DISCONTINUED | OUTPATIENT
Start: 2021-10-15 | End: 2021-10-16 | Stop reason: HOSPADM

## 2021-10-15 RX ORDER — DOCUSATE SODIUM 100 MG/1
100 CAPSULE, LIQUID FILLED ORAL DAILY
Status: DISCONTINUED | OUTPATIENT
Start: 2021-10-15 | End: 2021-10-16 | Stop reason: HOSPADM

## 2021-10-15 RX ORDER — OXYTOCIN 10 [USP'U]/ML
10 INJECTION, SOLUTION INTRAMUSCULAR; INTRAVENOUS
Status: DISCONTINUED | OUTPATIENT
Start: 2021-10-15 | End: 2021-10-16 | Stop reason: HOSPADM

## 2021-10-15 RX ADMIN — DOCUSATE SODIUM 100 MG: 100 CAPSULE, LIQUID FILLED ORAL at 09:50

## 2021-10-15 RX ADMIN — IBUPROFEN 800 MG: 400 TABLET ORAL at 09:50

## 2021-10-15 RX ADMIN — MISOPROSTOL 800 MCG: 200 TABLET ORAL at 02:21

## 2021-10-15 RX ADMIN — FENTANYL CITRATE 12 ML/HR: 50 INJECTION, SOLUTION INTRAMUSCULAR; INTRAVENOUS at 00:17

## 2021-10-15 RX ADMIN — ACETAMINOPHEN 650 MG: 325 TABLET, FILM COATED ORAL at 09:50

## 2021-10-15 RX ADMIN — ACETAMINOPHEN 650 MG: 325 TABLET, FILM COATED ORAL at 16:20

## 2021-10-15 RX ADMIN — IBUPROFEN 800 MG: 400 TABLET ORAL at 16:20

## 2021-10-15 RX ADMIN — OXYCODONE HYDROCHLORIDE 5 MG: 5 TABLET ORAL at 22:54

## 2021-10-15 RX ADMIN — ACETAMINOPHEN 650 MG: 325 TABLET, FILM COATED ORAL at 03:46

## 2021-10-15 RX ADMIN — Medication 340 ML/HR: at 02:13

## 2021-10-15 RX ADMIN — IBUPROFEN 800 MG: 400 TABLET ORAL at 03:46

## 2021-10-15 RX ADMIN — ROPIVACAINE HYDROCHLORIDE 10 ML: 2 INJECTION, SOLUTION EPIDURAL; INFILTRATION at 00:10

## 2021-10-15 NOTE — TELEPHONE ENCOUNTER
"Called Laurence to check in. She was in [facility] [place] earlier this evening for labor evaluation, was discharged home with labor instructions. She called FNA around 1950 reporting contractions were stronger and now 5 minutes apart. She was advised to present to [facility] [place] for evaluation. As of 2200 Laurence had not yet presented to [facility] [place] [facility] so I called to check in.     Laurence reports she had been trying to get her older son down for bed. She reports contractions are not every 5 minutes anymore but they are \"quite painful.\" When asked how frequently they are occurring Laurence states she's not exactly sure as she has been busy this evening and distracted by things at home. She had one contraction while we were on the phone and was able to continue to talk to me through the contraction. Denies srom and vaginal bleeding. Has had some episodes of light pink spotting/mucous. +FM    Reviewed labor instructions, generally true labor contractions become longer, stronger and closer together. Discussed it would be time to come in if contractions frequency and intensity increased or if she were unable to talk through contractions/be distracted during contractions. Advised continuing to monitor contractions, rest, hydrate, try taking a warm bath. Offered rx for vistaril, Laurence is accepting but states she likely will not go pick it up tonight, she would like to try and rest. She is to call back with signs of progressing labor, srom, vaginal bleeding or decreased FM. Laurence states understanding, she is aware I am on call and she can call back at any time if she desires assessment in MAC.     Carmen Santoyo, ALIYA, CNM    "

## 2021-10-15 NOTE — PLAN OF CARE
Pt, support person, and infant transferred to unit at 0424 accompanied by labor RN.  ID bands double verified.  Pt oriented to room and call light placed within reach.  Safety protocols including band checks, safe sleep practices, and bulb syringe use reviewed.  Pt verbally acknowledged understanding of teaching.  Encouraged to call w/questions or concerns.

## 2021-10-15 NOTE — PLAN OF CARE
Data: Patient presented to [facility] at 2324.   Reason for maternal/fetal assessment per patient is Rule Out Labor  .  Patient is a . Prenatal record reviewed.      OB History    Para Term  AB Living   3 1 1 0 1 1   SAB TAB Ectopic Multiple Live Births   0 0 0 0 1      # Outcome Date GA Lbr Jeyson/2nd Weight Sex Delivery Anes PTL Lv   3 Current            2 AB 2014     TAB      1 Term 13 40w3d  3.515 kg (7 lb 12 oz) M Vag-Spont EPI N HEIDI      Birth Comments: System Generated. Please review and update pregnancy details.      Name: Celestine      Apgar1: 8  Apgar5: 9   . Medical history:   Past Medical History:   Diagnosis Date     Abnormal Pap smear of cervix      Herpes simplex virus (HSV) infection     HSV2 (Diagnosed 1 year ago - asymptomatic)     Postpartum depression      Urinary tract infection    . Gestational Age 39w3d. VSS. Fetal movement present. Patient denies UTI symptoms, GI problems, edema, headache, visual disturbances, epigastric or URQ pain, abdominal pain, rupture of membranes. Patient has been laboring throughout the day. Was in [facility] earlier and sent home at 1cm. Presents now with worsening contractions, and blood when she wipes. Support persons Congregation present.  Action: Verbal consent for EFM. Triage assessment completed. EFM applied for fetal assessment. Uterine assessment shows regular contractions that palpate strong (see flow record). Fetal assessment: Presumed adequate fetal oxygenation documented (see flow record).   Response: MOSES Santoyo CNM informed of pt arrival, SVE, FHR/UC tracing discussed. Plan per provider is admit to L&D, [place] for epidural. Provider will enter ordres. Patient verbalized agreement with plan. Patient transferred to room 218 via bed, oriented to room and call light. Report given to RAJWINDER Liu RN.

## 2021-10-15 NOTE — PLAN OF CARE
Data: Laurence Torres transferred to 425 via wheelchair at 0420. Baby transferred via parent's arms.  Action: Receiving unit notified of transfer: Yes. Patient and family notified of room change. Report given to TRES Browning at 0420. Belongings sent to receiving unit. Accompanied by Registered Nurse. Oriented patient to surroundings. Call light within reach. ID bands double-checked with receiving RN.  Response: Patient tolerated transfer and is stable.

## 2021-10-15 NOTE — PLAN OF CARE
Vital signs stable. Postpartum assessment WDL. Pain controlled with tylenol and motrin Aqua K given for cramping pain declined narcotics this time  Patient up ambulating voiding without difficulty. Bottle feeding baby . Patient and infant bonding well. Will continue with current plan of care.

## 2021-10-15 NOTE — L&D DELIVERY NOTE
"OB Vaginal Delivery Note    Laurence Torres MRN# [id number]   Age: 26 year old YOB: 1995     Laurence received an epidural at 1210, found to be 8 CM at 0030. Laurence continued to rest comfortably, I checked in on her at 0145, BBOW noted at introitus. Room set up for delivery. AROM performed at 0200 for small amount of clear fluid, cervix 10cm/100/%/+3.  Began actively pushing at....   Laurence pushed with excellent maternal effort to an  of a viable infant male \"Cyan\" at 0210. Baby placed on maternal abdomen, lusty cry and good tone. APGARs 8,9. Large gush of blood following delivery of infant, IV pitocin started immediately and placenta delivered spontaneously, intact shortly thereafter. Trailing membranes noted and teased out with ring forceps. Small perineal skin abrasion that is hemostatic and thus not repaired. No other lacerations. Uterus firm, 2 below umbilicus with small trickle of blood, 800 mcg of rectal cytotec given. . Mother and baby in stable condition.     Of note, Laurence had 3 elevated blood pressures upon admission (138/91, 143/82, 145/72) less than 4 hours apart prior to epidural placement. Laurence denies s/sx of preeclampsia. Preeclampsia labs drawn and are WNL. Urine PCR was collected via straight cath and results are currently in process. BPs have been WNL since 0100.     GA: 39w3d  GP:   Labor Complications: None   EBL:   mL  Delivery QBL: 371 mL  Delivery Type: Vaginal, Spontaneous   ROM to Delivery Time: rupture date or rupture time have not been documented  [place] Weight:     1 Minute 5 Minute 10 Minute   Apgar Totals: 8   9        JULIO CESAR TOMLIN;MADISON DANIELLE     Delivery Details:  Laurence Torres, a 26 year old  female delivered a viable infant with apgars of 8  and 9 . Patient was fully dilated and pushing after   hours   minutes in active labor. Delivery was via vaginal, spontaneous  to a sterile field under epidural  anesthesia. Infant delivered " in vertex  left  occiput  anterior  position. Anterior and posterior shoulders delivered without difficulty. The cord was clamped, cut twice and 3 vessels  were noted. Cord blood was obtained in routine fashion with the following disposition: lab .      Cord complications: none   Placenta delivered at 10/15/2021  2:14 AM . Placental disposition was Hospital disposal . Fundal massage performed and fundus found to be firm.     Episiotomy: none    Perineum, vagina, cervix were inspected, and the following lacerations were noted:   Perineal lacerations: none                Excellent hemostasis was noted. Needle count correct. Infant and patient in delivery room in good and stable condition.        TorresKieran [[id number]]    Labor Event Times    Labor onset date: 10/13/21 Onset time:  8:00 PM      Labor Events     labor?: No   steroids: None  Labor Type: Spontaneous  Predominate monitoring during 1st stage: continuous electronic fetal monitoring     Rupture date/time: 10/15/21 0200   Rupture type: Artificial Rupture of Membranes  Fluid color: Clear  Fluid odor: Normal     Augmentation: AROM     Delivery/Placenta Date and Time    Delivery Date: 10/15/21 Delivery Time:  2:10 AM   Placenta Date/Time: 10/15/2021  2:14 AM  Oxytocin given at the time of delivery: after delivery of baby  Delivering clinician: Carmen Hill CNM   Other personnel present at delivery:  Provider Role   Mel Liu, RN Delivery Nurse   Sindhu Richardson, RN Delivery Nurse         Vaginal Counts     Initial count performed by 2 team members:  Two Team Members   CHEVY Groves RNC       [place] Suture Needles Sponges (RETIRED) Instruments   Initial counts 2 0 5    Added to count       Relief counts       Final counts             Placed during labor Accounted for at the end of labor   FSE NA NA   IUPC NA NA   Cervadil NA NA                     Apgars    Living status: Living   1 Minute 5 Minute 10  Minute 15 Minute 20 Minute   Skin color: 0  1       Heart rate: 2  2       Reflex irritability: 2  2       Muscle tone: 2  2       Respiratory effort: 2  2       Total: 8  9       Apgars assigned by: LOLI DANIELLE RN     Cord    Vessels: 3 Vessels    Cord Complications: None               Cord Blood Disposition: Lab    Gases Sent?: No    Delayed cord clamping?: Yes    Cord Clamping Delay (seconds):  seconds    Stem cell collection?: No       Skin to Skin and Feeding Plan    Skin to skin initiation date/time: 1/10/1841    Skin to skin with: Mother  Skin to skin end date/time:        Labor Events and Shoulder Dystocia    Fetal Tracing Prior to Delivery: Category 2  Fetal Tracing Comments: intermittent variable decelerations   Shoulder dystocia present?: Neg     Delivery (Maternal) (Provider to Complete) ([id number])    Episiotomy: None  Perineal lacerations: None    Repair suture: None  Number of repair packets: 0  Genital tract inspection done: Pos     Blood Loss  Mother: Laurence Torres P #[id number]   Start of Mother's Information    Delivery Blood Loss  10/13/21 2000 - 10/15/21 0309    Delivery QBL (mL) Hospital Encounter 371 mL    Total  371 mL         End of Mother's Information  Mother: Laurence Torres P #[id number]          Delivery - Provider to Complete ([id number])    Delivering clinician: Carmen Hill CNM  CNM Care: Exclusive CNM care in labor  Attempted Delivery Types (Choose all that apply): Spontaneous Vaginal Delivery  Delivery Type (Choose the 1 that will go to the Birth History): Vaginal, Spontaneous                   Other personnel:  Provider Role   Mel Liu RN Delivery Nurse   Sindhu Danielle RN Delivery Nurse                Placenta    Date/Time: 10/15/2021  2:14 AM  Removal: Spontaneous  Comments: [place]roy mechanism, trailing membranes teased out with ring forceps  Disposition: Hospital disposal           Anesthesia    Method: Epidural  Cervical dilation at placement: 4-7                 Presentation and Position    Presentation: Vertex    Position: Left Occiput Anterior                 Assessment/Plan:   1. : routine postpartum cares, hemoglobin timed for 0600 of PPD #1 . Consider discharge PPD #1  2. Lactating mother: lactation support and consultation PRN. Laurence had trouble with supply with first baby. She is considering breastfeeding again this time around but may also choose to formula feed.  3. High risk for PPD: monitor mood closely, plan for check in at 1, 2 and 6 weeks postpartum     ALIYA Denny, CNM

## 2021-10-15 NOTE — H&P
[facility] Labor Admission History & Physical    Laurence Torres is a 26 year old  with an IUP at 39w2d  -American; partnered,   Partner/support Person: Jainism   Language Barrier: English  [facility]  Provider: CN's    Laurence Torres is admitted to the Birthplace at [facility] on 10/14/2021 at 11:45 PM       History of present inllness/Chief Complaint:  Laurence has been having contractions all day, was in [facility] earlier this evening for labor evaluation, was found to be 1cm/thick and was discharged home. At 1945 she called [facility] with contractions every 5 minutes and increasing intensity. Around 2200 I spoke with Laurence as she had not yet presented to [facility], she stated she was unsure how often contractions were coming and unsure if she needed to come in or not. She opted to stay home and closely monitor contractions. Spoke with Laurence again at 2245 and she reports increasing contractions intensity, she was advised to present to [facility] for labor evaluation.     Here with: spontaneous onset of labor  Patient reports contractions are Regular           Baby active Yes  Membranes are intact.  Bloody show Yes, per pt  Any changes with medical history since last prenatal visit No      Obstetrical history  Estimated Date of Delivery: Oct 19, 2021 determined by LMP  Patient's last menstrual period was 2021.   Dating U/S: 21    Fetal anatomic survey: Normal  Placenta: Anterior    PRENATAL COURSE  Prenatal care began at 11 wks gestation for a total of 12 prenatal visits.  Total wt gain 45; Body mass index is 26.61 kg/m .  Prenatal Blood Pressure: WNL  Prenatal course was complicated by history of postpartum depression, history of suicidal ideation, asthma, HSV-2, anemia   Tdap: 21    Patient Active Problem List   Diagnosis     Pregnancy, supervision, high-risk     Acne vulgaris     ASCUS with positive high risk HPV cervical     Asthma     Mild intermittent asthma      HSV-2 seropositive     History of postpartum depression     History of suicidal ideation     Anemia affecting pregnancy in third trimester     Indication for care in labor or delivery     Encounter for triage in pregnant patient       HISTORY  No Known Allergies  Past Medical History:   Diagnosis Date     Abnormal Pap smear of cervix      Herpes simplex virus (HSV) infection     HSV2 (Diagnosed 1 year ago - asymptomatic)     Postpartum depression      Urinary tract infection      Past Surgical History:   Procedure Laterality Date     EXTRACTION(S) DENTAL      [place] Teeth     Family History   Problem Relation Age of Onset     Lung Cancer Maternal Grandmother      Social History     Tobacco Use     Smoking status: Former Smoker     Types: Vaping Device     Quit date: 2020     Years since quittin.7     Smokeless tobacco: Never Used   Substance Use Topics     Alcohol use: Never     OB History    Para Term  AB Living   3 1 1 0 1 1   SAB TAB Ectopic Multiple Live Births   0 0 0 0 1      # Outcome Date GA Lbr Jeyson/2nd Weight Sex Delivery Anes PTL Lv   3 Current            2 AB 2014     TAB      1 Term 13 40w3d  3.515 kg (7 lb 12 oz) M Vag-Spont EPI N HEIDI      Birth Comments: System Generated. Please review and update pregnancy details.      Name: Celestine      Apgar1: 8  Apgar5: 9       LABS:  Lab Results   Component Value Date    ABO O 2021    RH Pos 2021    AS Negative 10/14/2021    HGB 11.6 (L) 10/14/2021    HEPBANG Nonreactive 2021    CHPCRT Negative 2021    GCPCRT Negative 2021       GBS Status: Neg  Rubella: Immune    HIV: Non-Reactive   Platelets:  300  1hr GCT:  92    ROS   Pt is alert and oriented  Pt denies significant constitutional symptoms including fever and/or malaise.    Pt denies significant respiratory, cardiovacular, GI, or muscular/skeletal complaints.    Neuro: Denies HA and visual changes  Muscoloskeletal: Denies except for discomforts r/t  "pregnancy     PHYSICAL EXAM:  /64   Temp 97.3  F (36.3  C) (Temporal)   Resp 16   Ht 1.727 m (5' 8\")   Wt 79.4 kg (175 lb)   LMP 2021   SpO2 98%   BMI 26.61 kg/m    General appearance:  healthy, alert, active and moderate distress, uncomfortable with contractions   Heart: RRR  Lungs: CTA bilaterally, normal respiratory effort  Abdomen: gravid, single vertex fetus, non-tender, EFW 7.5-8 lbs.   Legs:  trace edema     Contractions: Pt is yasmin every 2-3 minutes, lasting  seconds and palpates strong    Fetal heart tones: Baseline 140   Variability: moderate   Accelerations: present  Decelerations: absent    NST: reactive    Cervix: 6-7cm/ 80%/-2/Anterior per MAC RN  Bloody show: no  Membranes:  intact    ASSESSMENT:  26 year old  with price IUP 39w2d in active labor  NST reactive  GBS negative and membranes intact  HSV-2 on prophylaxis   Hx of PPD  Asthma   Anemia in pregnancy     PLAN:  Routine CNM care  Labs ordered: CBC with platelets, syphilis screening, COVID test, and type and screen  Teaching done r/t comfort measures, pain management options, and labor processes  Admit - see IP orders  Pain medication - requesting epidural, epidural prep beginning now  Anticipate     ALIYA Denny, CNM    "

## 2021-10-15 NOTE — ANESTHESIA PROCEDURE NOTES
Epidural catheter Procedure Note    Pre-Procedure   Staff -        Anesthesiologist:  Manuel Johnson MD       Performed By: Anesthesiologist       Location: OB       Pre-Anesthestic Checklist: patient identified, risks and benefits discussed, informed consent, monitors and equipment checked, pre-op evaluation and at physician/surgeon's request  Timeout:       Correct Patient: Yes        Correct Procedure: Yes        Correct Site: Yes        Correct Position: Yes   Procedure Documentation  Procedure: epidural catheter       Patient Position: sitting       Skin prep: Chloraprep      Local skin infiltrated with mL of 1% lidocaine.        Insertion Site: L3-4. (midline approach).       Technique: LORT saline        KATHRYN at 7 cm.       Needle Type: Touhy needle       Needle Gauge: 17.        Needle Length (Inches): 5        Catheter: 18 G.         Catheter threaded easily.         Threaded 11 cm at skin.        # of attempts: 1 and  # of redirects:     Assessment/Narrative         Paresthesias: No.     Test dose of 3 mL at.         Test dose negative, 3 minutes after injection, for signs of intravascular, subdural, or intrathecal injection.       Insertion/Infusion Method: LORT saline       Aspiration negative for Heme or CSF via Epidural Catheter.    Medication(s) Administered   0.2% Ropivacaine (Epidural), 10 mL  Medication Administration Time: 10/15/2021 12:10 AM     Comments:  Risks, benefits, alternatives of epidural analgesia discussed with the patient who agrees to proceed.  After a procedural timeout confirming the correct patient and details of the procedure, 1% lidocaine was injected superficially over the skin for topical anesthesia.  A 17 G Tuohy needle was advanced at the above lumbar interspace until contact was felt with ligamentum flavum.  Loss of resistance to saline was encountered at above noted depth.  3 ml of saline was injected to expand the epidural space.  The epidural catheter was then easily  threaded to the depth noted above.  Paresthesias were as noted above.Ropivacaine 10 ml bolus via epidural catheter administered at end of procedure.  Patient tolerated well.

## 2021-10-15 NOTE — LACTATION NOTE
LC attempted to visit.  Mother sleeping at this time.  Will attempt to re-visit at a later time.

## 2021-10-15 NOTE — PLAN OF CARE
After obtaining verbal consent from patient, nasopharyngeal swab for COVID-19 test performed. Labeled with patient label and delivered to laboratory.

## 2021-10-15 NOTE — ANESTHESIA PREPROCEDURE EVALUATION
Anesthesia Pre-Procedure Evaluation    Patient: Laurence Torres   MRN: [id number] : 1995        Preoperative Diagnosis: * No surgery found *    Procedure :           Past Medical History:   Diagnosis Date     Abnormal Pap smear of cervix      Herpes simplex virus (HSV) infection     HSV2 (Diagnosed 1 year ago - asymptomatic)     Postpartum depression      Urinary tract infection       Past Surgical History:   Procedure Laterality Date     EXTRACTION(S) DENTAL      Rena Lara Teeth      No Known Allergies   Social History     Tobacco Use     Smoking status: Former Smoker     Types: Vaping Device     Quit date: 2020     Years since quittin.7     Smokeless tobacco: Never Used   Substance Use Topics     Alcohol use: Never      Wt Readings from Last 1 Encounters:   10/14/21 79.4 kg (175 lb)        Anesthesia Evaluation            ROS/MED HX  ENT/Pulmonary:    (-) asthma   Neurologic:  - neg neurologic ROS     Cardiovascular:    (-) PIH   METS/Exercise Tolerance:     Hematologic:       Musculoskeletal:       GI/Hepatic:     (+) GERD,     Renal/Genitourinary:       Endo:       Psychiatric/Substance Use:       Infectious Disease:       Malignancy:       Other:            Physical Exam    Airway        Mallampati: II   TM distance: > 3 FB   Neck ROM: full     Respiratory Devices and Support         Dental  no notable dental history         Cardiovascular   cardiovascular exam normal          Pulmonary   pulmonary exam normal                OUTSIDE LABS:  CBC:   Lab Results   Component Value Date    WBC 10.0 2021    WBC 9.0 2021    HGB 9.9 (L) 2021    HGB 12.1 2021    HCT 29.2 (L) 2021    HCT 35.3 2021     2021     2021     BMP: No results found for: NA, POTASSIUM, CHLORIDE, CO2, BUN, CR, GLC  COAGS: No results found for: PTT, INR, FIBR  POC: No results found for: BGM, HCG, HCGS  HEPATIC: No results found for: ALBUMIN, PROTTOTAL, ALT, AST, GGT,  ALKPHOS, BILITOTAL, BILIDIRECT, RITCHIE  OTHER: No results found for: PH, LACT, A1C, WILLIAM, PHOS, MAG, LIPASE, AMYLASE, TSH, T4, T3, CRP, SED    Anesthesia Plan    ASA Status:  2      Anesthesia Type: Epidural.              Consents    Anesthesia Plan(s) and associated risks, benefits, and realistic alternatives discussed. Questions answered and patient/representative(s) expressed understanding.     - Discussed with:  Patient         Postoperative Care            Comments:    Orders to manage the epidural infusion have been entered, and through coordination with the nurse, we will continute to manage and monitor the patient's labor epidural.  We will continuously be available to adjust as needed thruout the entire L&D process.             Manuel Johnson MD

## 2021-10-15 NOTE — TELEPHONE ENCOUNTER
Laurence called L&D unti at 8085 asking to speak to OSWALDO. She now reports she is crying in pain with contractions and does not know what to do. Is feeling like contractions are now more regular and feeling them lower down. She does not think she can sleep through these contractions. Advised coming to [facility] for labor eval. Laurence is in agreement. [facility] notified.     ALIYA Denny, CHEVY

## 2021-10-15 NOTE — TELEPHONE ENCOUNTER
OB Triage Call      Is patient's OB/Midwife with the formerly [facility] or [facility]? [facility]- Proceed with triage     Reason for call: Contractions.     Assessment: Patient calling reporting having contractions every 5 minutes. Denies ruptured membrane.     Plan: Advised patient to be seen at Labor and Delivery.     Patient plans to deliver at [facility]    Patient's primary OB Provider is Midwife group.      Per protocol recommendations Patient to be evaluated in L&D. Patient's primary OB is [facility]. Paged on-call midwife for patient's primary OB clinic (refer to where patient is seen as midwives may go to multiple locations) Carmen Hill CNM to call [facility]NA back at [phone].  Call returned at 7:41 and advised on triage assessment. Does midwife recommend L&D evaluation? Yes-  Labor and delivery at [facility] ( [phone]) notified of patient's pending arrival. Patient notified to go to L&D by RN Gave report to Cuca JOSHUA    Is patient's delivering hospital on divert? No      39w2d    Estimated Date of Delivery: Oct 19, 2021        OB History    Para Term  AB Living   3 1 1 0 1 1   SAB TAB Ectopic Multiple Live Births   0 0 0 0 1      # Outcome Date GA Lbr Jeyson/2nd Weight Sex Delivery Anes PTL Lv   3 Current            2 AB 2014     TAB      1 Term 13 40w3d  3.515 kg (7 lb 12 oz) M Vag-Spont EPI N HEIDI      Birth Comments: System Generated. Please review and update pregnancy details.      Name: Celestine      Apgar1: 8  Apgar5: 9       No results found for: GBS       Rome Rowland RN 10/14/21 7:30 PM  [facility] Nurse Advisor    Reason for Disposition    [1] History of prior delivery (multipara) AND [2] contractions < 10 minutes apart AND [3] present 1 hour    Additional Information    Negative: Passed out (i.e., lost consciousness, collapsed and was not responding)    Negative: Shock suspected (e.g., cold/pale/clammy skin, too weak to stand, low BP, rapid pulse)    Negative:  "Difficult to awaken or acting confused (e.g., disoriented, slurred speech)    Negative: [1] SEVERE abdominal pain (e.g., excruciating) AND [2] constant AND [3] present > 1 hour    Negative: Severe bleeding (e.g., continuous red blood from vagina, or large blood clots)    Negative: Umbilical cord hanging out of the vagina (shiny, white, curled appearance, \"like telephone cord\")    Negative: Uncontrollable urge to push (i.e., feels like baby is coming out now)    Negative: Can see baby    Negative: Sounds like a life-threatening emergency to the triager    Negative: [1] First baby (primipara) AND [2] contractions < 6 minutes apart  AND [3] present 2 hours    Protocols used: PREGNANCY - LABOR-A-AH      "

## 2021-10-16 VITALS
HEART RATE: 65 BPM | OXYGEN SATURATION: 95 % | DIASTOLIC BLOOD PRESSURE: 76 MMHG | TEMPERATURE: 98 F | HEIGHT: 68 IN | RESPIRATION RATE: 16 BRPM | SYSTOLIC BLOOD PRESSURE: 120 MMHG | WEIGHT: 175 LBS | BODY MASS INDEX: 26.52 KG/M2

## 2021-10-16 LAB — HGB BLD-MCNC: 11.2 G/DL (ref 11.7–15.7)

## 2021-10-16 PROCEDURE — 250N000013 HC RX MED GY IP 250 OP 250 PS 637: Performed by: ADVANCED PRACTICE MIDWIFE

## 2021-10-16 PROCEDURE — 85018 HEMOGLOBIN: CPT | Performed by: ADVANCED PRACTICE MIDWIFE

## 2021-10-16 PROCEDURE — 36415 COLL VENOUS BLD VENIPUNCTURE: CPT | Performed by: ADVANCED PRACTICE MIDWIFE

## 2021-10-16 RX ORDER — SERTRALINE HYDROCHLORIDE 25 MG/1
25 TABLET, FILM COATED ORAL DAILY
Qty: 30 TABLET | Refills: 0 | Status: SHIPPED | OUTPATIENT
Start: 2021-10-16 | End: 2021-10-18

## 2021-10-16 RX ADMIN — DOCUSATE SODIUM 100 MG: 100 CAPSULE, LIQUID FILLED ORAL at 08:51

## 2021-10-16 RX ADMIN — OXYCODONE HYDROCHLORIDE 5 MG: 5 TABLET ORAL at 03:03

## 2021-10-16 NOTE — PLAN OF CARE
VSS. Voiding without difficulty. Scant vaginal bleeding. Pain controlled with oxycodone (declined ibuprofen and tylenol). Using aqua k pad for comfort. Bottle feeding infant formula. FOB at bedside, supportive and involved in cares. Will continue to monitor.

## 2021-10-16 NOTE — ANESTHESIA POSTPROCEDURE EVALUATION
Patient: Laurence Torres    Procedure: * No procedures listed *       Diagnosis:* No pre-op diagnosis entered *  Diagnosis Additional Information: No value filed.    Anesthesia Type:  Epidural    Note:  Disposition: Admission   Postop Pain Control: Uneventful            Sign Out: Well controlled pain   PONV: No   Neuro/Psych: Uneventful            Sign Out: Acceptable/Baseline neuro status   Airway/Respiratory: Uneventful            Sign Out: Acceptable/Baseline resp. status   CV/Hemodynamics: Uneventful            Sign Out: Acceptable CV status   Other NRE: NONE   DID A NON-ROUTINE EVENT OCCUR? No           Last vitals:  Vitals Value Taken Time   BP     Temp     Pulse     Resp     SpO2         Electronically Signed By: Jass Law MD  October 16, 2021  4:07 PM

## 2021-10-16 NOTE — PROVIDER NOTIFICATION
10/15/21 [id number]   Provider Notification   Provider Name/Title Mildred De Jesus CNM   Method of Notification Phone   Request Evaluate-Remote   Notification Reason Medication Request     Pt having increased pain and requesting stronger pain medication. Verbal order received for 5mg Oxycodone q4h PRN.

## 2021-10-16 NOTE — DISCHARGE SUMMARY
"CNM Postpartum Discharge Note    SIGNIFICANT PROBLEMS:  Patient Active Problem List    Diagnosis Date Noted     Indication for care in labor or delivery 10/14/2021     Priority: Medium     Encounter for triage in pregnant patient 10/14/2021     Priority: Medium     Anemia affecting pregnancy in third trimester 08/27/2021     Priority: Medium     Acne vulgaris 04/05/2021     Priority: Medium     Asthma 04/05/2021     Priority: Medium     HSV-2 seropositive 04/05/2021     Priority: Medium     Diagnosed 2020, asymptomatic       History of postpartum depression 04/05/2021     Priority: Medium     History of suicidal ideation 04/05/2021     Priority: Medium     In May 2020 (see ED note)       Pregnancy, supervision, high-risk 04/02/2021     Priority: Medium     **HR  FOB: Nondenominational   GINA: Oct 19, 2021  O+Placenta:anterior   Sex: BOY  Innatal negative  Tdap: 7/30  FLU: 9/24 GBS: neg  Hx: frequent BV, PP Depression, asthma  HSV2-prophylaxis @36w  COVID vax   1 Hr GCT/hgb: Passed 92, 9.9       ASCUS with positive high risk HPV cervical 02/20/2018     Priority: Medium     Mild intermittent asthma 07/31/2011     Priority: Medium         SUBJECTIVE:  Patient is stable and is tolerating acitivity well  Baby is rooming in  Complications since 2 hours post delivery: None  Pain is well controlled.  Patient is taking pain medications.  Breastfeeding status:initiated and formula fed   Elimination:  She is voiding without difficulty.  She has not had a bowel movement  Denies heavy bleeding and passing large clots.    INTERVAL HISTORY:  /76   Pulse 65   Temp 98  F (36.7  C) (Oral)   Resp 16   Ht 1.727 m (5' 8\")   Wt 79.4 kg (175 lb)   LMP 01/12/2021   SpO2 95%   Breastfeeding Unknown   BMI 26.61 kg/m      Constitutional: healthy, alert and no distress    Breasts: Currently breastfeeding    Fundus: Uterine fundus is firm, non-tender and at 1 cm below the level of the umbilicus    Perineum: Perineum is intact and/or well " approximated, minimal swelling    Lochia: Lochia is appropriate for the duration of time since delivery.     Postpartum hemoglobin   Hemoglobin   Date Value Ref Range Status   10/16/2021 11.2 (L) 11.7 - 15.7 g/dL Final   04/05/2021 12.1 11.7 - 15.7 g/dL Final     Blood type   Lab Results   Component Value Date    ABO O 04/05/2021       Lab Results   Component Value Date    RH Pos 04/05/2021     Rubella status No results found for: RUBELLAABIGG  History of depression:  yes - never taken medication and wants to start    ASSESSMENT/PLAN:  Normal postpartum course  Stable Post-partum day #1  Complications:none  Postpartum warning s/s reviewed, including bleeding/clots, fever, mastitis & thromboemboli     Continue prenatal vitamins while breastfeeding    Educated on postpartum blues and postpartum depression warnings signs/symptoms. She is wanting to start a postpartum depression medication. Reviewed options and starting on Zoloft. Zoloft 25 mg per day. She may increase dosage by 12.5 mg per day every 3 days up to 50 mg per day. We will check in with her at her 2 week postpartum visit and order a refill on the medication.    Follow-up in 2 and 6 weeks with CNMs at [facility]. She will need a PHQ9/GAD7 with both visits.   Plan d/c home today    Current Discharge Medication List      START taking these medications    Details   sertraline (ZOLOFT) 25 MG tablet Take 1 tablet (25 mg) by mouth daily  Qty: 30 tablet, Refills: 0    Associated Diagnoses: History of postpartum depression         STOP taking these medications       ferrous sulfate (FEROSUL) 325 (65 Fe) MG tablet Comments:   Reason for Stopping:         hydrOXYzine (VISTARIL) 50 MG capsule Comments:   Reason for Stopping:         Prenatal Vit-Fe Fumarate-FA (PRENATAL VITAMIN PO) Comments:   Reason for Stopping:         valACYclovir (VALTREX) 1000 mg tablet Comments:   Reason for Stopping:               ALIYA Archer CNM

## 2021-10-16 NOTE — PLAN OF CARE
Vital signs stable. Postpartum assessment WDL. Pain well controlled  denies meds now  Patient up ambulating voiding without difficulty. Bottle feeding baby ready to go home home care sent   Patient and infant bonding well. Will continue with current plan of care.

## 2021-10-16 NOTE — PLAN OF CARE
D: VSS, assessments WDL.   I: Pt. received complete discharge paperwork and home medications as filled by discharge pharmacy.  Pt. was given times of last dose for all discharge medications in writing on discharge medication sheets.  Discharge teaching included home medication, pain management, activity restrictions, postpartum cares, and signs and symptoms of infection.    A: Discharge outcomes on care plan met.  Mother states understanding and comfort with self care and follow up care.   P: Pt. Discharged.  Pt. was accompanied by Boy friend  and left with personal belongings.  Home care sent  Pt. to follow up with OB provider per discharge instructions.  Pt. had no further questions at the time of discharge and no unmet needs were identified.

## 2021-10-18 ENCOUNTER — HOSPITAL ENCOUNTER (EMERGENCY)
Facility: CLINIC | Age: 26
Discharge: HOME | End: 2021-10-18
Attending: EMERGENCY MEDICINE | Admitting: EMERGENCY MEDICINE
Payer: COMMERCIAL

## 2021-10-18 VITALS
SYSTOLIC BLOOD PRESSURE: 125 MMHG | OXYGEN SATURATION: 97 % | BODY MASS INDEX: 25.11 KG/M2 | HEIGHT: 68 IN | DIASTOLIC BLOOD PRESSURE: 81 MMHG | RESPIRATION RATE: 16 BRPM | HEART RATE: 65 BPM | TEMPERATURE: 98.9 F | WEIGHT: 165.7 LBS

## 2021-10-18 DIAGNOSIS — Z87.59 HISTORY OF POSTPARTUM DEPRESSION: ICD-10-CM

## 2021-10-18 DIAGNOSIS — F43.22 ADJUSTMENT DISORDER WITH ANXIOUS MOOD: ICD-10-CM

## 2021-10-18 DIAGNOSIS — F32.5 MAJOR DEPRESSIVE DISORDER, SINGLE EPISODE, IN REMISSION (H): ICD-10-CM

## 2021-10-18 DIAGNOSIS — Z86.59 HISTORY OF POSTPARTUM DEPRESSION: ICD-10-CM

## 2021-10-18 LAB — SARS-COV-2 RNA RESP QL NAA+PROBE: NEGATIVE

## 2021-10-18 PROCEDURE — 99203 OFFICE O/P NEW LOW 30 MIN: CPT | Performed by: PSYCHIATRY & NEUROLOGY

## 2021-10-18 PROCEDURE — 99285 EMERGENCY DEPT VISIT HI MDM: CPT | Mod: 25

## 2021-10-18 PROCEDURE — C9803 HOPD COVID-19 SPEC COLLECT: HCPCS

## 2021-10-18 PROCEDURE — 90791 PSYCH DIAGNOSTIC EVALUATION: CPT

## 2021-10-18 PROCEDURE — 87635 SARS-COV-2 COVID-19 AMP PRB: CPT | Performed by: EMERGENCY MEDICINE

## 2021-10-18 RX ORDER — HYDROXYZINE PAMOATE 25 MG/1
25 CAPSULE ORAL 3 TIMES DAILY PRN
Qty: 30 CAPSULE | Refills: 0 | Status: SHIPPED | OUTPATIENT
Start: 2021-10-18 | End: 2023-05-01

## 2021-10-18 RX ORDER — SERTRALINE HYDROCHLORIDE 25 MG/1
25 TABLET, FILM COATED ORAL DAILY
Refills: 0 | COMMUNITY
Start: 2021-10-18 | End: 2021-11-08

## 2021-10-18 ASSESSMENT — MIFFLIN-ST. JEOR: SCORE: 1550.11

## 2021-10-18 ASSESSMENT — ACTIVITIES OF DAILY LIVING (ADL): HYGIENE/GROOMING: INDEPENDENT

## 2021-10-18 NOTE — ED TRIAGE NOTES
pt is postpartum three days - hx of postpartum depression 8 years ago, woke up tonight feeling severe anxiety and fear and called ambulance. denies si/hi

## 2021-10-18 NOTE — DISCHARGE INSTRUCTIONS
"    Learn new skills to improve your mental wellbeing  [facility] [employer] offers virtual postpartum mental health care:  [email or web address]    [facility]  Mental Health Intensive Outpatient Programming  Call [phone] for more info     Visit the following website for more  resources for mom's in [place]:  https://ppsupport[place].org/    If I am feeling unsafe or I am in a crisis, I will:   Contact my established care providers   Call the National Suicide Prevention Lifeline: 955.625.8306   Go to the nearest emergency room   Call [phone]     Warning signs that I or other people might notice when a crisis is developing for me: fuzzy brain feeling, wave of fear or panic, feeling afraid of depression taking over     Things I am able to do on my own to cope or help me feel better: breathing exercises, cleaning, taking a walk, watching old TV shows   1. try an ice pack over the brow  2. Use a mindfulness training brigitte     Things that I am able to do with others to cope or help me better: talking, walk, reminders of breathing exercises     Things I can use or do for distraction: TV, walks, shows     Changes I can make to support my mental health and wellness:   Manage sleep and nutrition, ask for help as needed     People in my life that I can ask for help: Mom, Dad, partner, [place], family     Your [facility] has a mental health crisis team you can call : [facility] Crisis Line Number: [phone]     Other things that are important when I m in crisis: this will pass, I am in control of my body, I can ask for help     Additional resources and information:   \"Mindfulness \" (published by the [employer]) is one of many apps that offer guided mindfulness practices. This brigitte is available for free with unlimited access to dozens of evidence based practices, known to have a positive effect on anxiety and depression symptoms.     Mindfulness practices teach your brain to " stay in the moment. Anxiety and depression symptoms trick your brain into living in the past or future: worrying about unknown outcomes or predicting worst case scenarios, reflecting with sadness, guilt or shame on past events which have caused you pain. When the mind is chasing these past and future events, you are not fully present in the moment.     More to the point, these past and future situations are outside of your control. Mindfulness is training your brain to be present and makes you better able to manage yourself in the moment you are in - the present moment.     Other apps people have found to be helpful are also free to down load but may have some restrictions to the content you are able to view, such as needing a subscription for unrestricted access. You may wish to try the following:   10% Happier  Buddhify  Calm  Insight Timer  Headspace

## 2021-10-18 NOTE — PROGRESS NOTES
"26 year old female with history of post partum depression received from ED due to anxiety after giving birth three days ago. Reports she had similar feelings when she gave birth to her first child 8 years ago, but it was more of a \"baby blues\", and not as severe as this. At this time she feels extremely anxious and says she has \"a sense of fear that I can't explain\". She is tearful, but very pleasant. She lives with her boyfriend, and their now two sons. She says she has some baseline generalized anxiety, but nothing that has required medication or any kind of help from a psychiatrist. She is willing to stay the night to talk to a psychiatrist in the morning, and is currently talking to LMHP at this time. Denies SI/HI.     Nursing and risk assessments completed. Assessments reviewed with LMHP and physician. Video monitoring in progress, patient informed.  Admission information reviewed with patient. Patient given a tour of [facility] and instructions on using the facility. Questions regarding [facility] addressed. Pt search completed and belongings inventoried.    "
no back pain,

## 2021-10-18 NOTE — ED NOTES
[facility] Reassessment and Progress Note    Client Name: Laurence Torres  Date: October 18, 2021       Presenting issue that brought patient to the [facility]PATH unit:   Laurence is a 26 year old mother of 2, she is 3 days post partum. Pt reports she experienced a panic attack and was unable to gain control of her anxiety and asked her boyfriend to call 911 for her.     Current presentation on the unit: pt presents as calm and cooperative, she is actively engaged in reassessment and discharge planning. Pt reports she feels rested and is currently waiting to discharge home to her new baby, experiencing discomfort of engorged breasts. Writer and pt explore known coping and identify new skills to try. Coached pt with mindfulness and breathing exercises.     Current risk to self or others? No    Summary of therapeutic interventions completed with patient: coaching with mindfulness and breathwork, processed stressors and fears. Pt identifies fear of her past experience with postpartum depression after the birth of her 8 year old, explored her hopes for proactively maintaining mental wellbeing    Treatment objectives addressed in this session:  Behavior chain analysis completed, coached with mindfulness and breath work    [facility] Treatment Plan    Client's Name: Laurence Torres  YOB: 1995    DSM-5 Diagnoses:  F43.22 Adjustment Disorder with anxious mood    Psychosocial / Contextual Factors: Patient presented to the [facility] unit with the following concerns: anxiety    Anticipated number of sessions or this episode of care: 1-4    MeasurableTreatment Goal(s) related to diagnosis / functional impairment(s)    Goal 1: Patient will identify precipitants and coping strategies to address anxiety.     Objective #A     Patient will identify factors contributing to anxiety and panic  Status: New as of October 18, 2021    Intervention(s)  [facility] will complete behavior chain analysis    Objective #B  Patient will learn and  practice adaptive coping skills  Status: New as of October 18, 2021    Intervention(s)  LMHP will  pt with breathing exercises and mindfulness training              Appearance:   Appropriate    Eye Contact:   Good    Psychomotor Behavior: Normal    Attitude:   Cooperative    Orientation:   All   Speech    Rate / Production: Normal     Volume:  Normal    Mood:    Normal   Affect:    Appropriate    Thought Content:  Clear    Thought Form:  Coherent  Logical    Insight:    Good           PLAN:   Patient will board in [facility] until patient and treatment deem it appropriate to either discharge or admit to a higher level of care. Details: discharge home with plan     Progress on treatment goals: completed    Additional collateral information: robust family support and pt father will bring her home today     Plan: discharge home with family, pt will have therapy appointment and follow with PCP for med mgmt  Disposition: Individual therapy  and Medication management can be followed by PCP    Rationale for disposition: pt crisis is resolved, she denies SI, SIB, HI, aggression and psychotic symptoms    Reviewed assessment with attending provider: Dr. Adalberto Cummings     Total time spent with patient:.50 hrs     CPT code: 82163 - Psychotherapy (with patient) - 30 (16-37*) min      JASON Pollard     If I am feeling unsafe or I am in a crisis, I will:   Contact my established care providers   Call the National Suicide Prevention Lifeline: [phone]   Go to the nearest emergency room   Call [phone]          Warning signs that I or other people might notice when a crisis is developing for me: fuzzy brain feeling, wave of fear or panic, feeling afraid of depression taking over     Things I am able to do on my own to cope or help me feel better: breathing exercises, cleaning, taking a walk, watching old TV shows   1. try an ice pack over the brow  2. Use a mindfulness training brigitte     Things that I am able to do with others  "to cope or help me better: talking, walk, reminders of breathing exercises     Things I can use or do for distraction: TV, walks, shows     Changes I can make to support my mental health and wellness:   Manage sleep and nutrition, ask for help as needed     People in my life that I can ask for help: Mom, Dad, partner, [place], family     Your [facility] has a mental health crisis team you can call 24/7: [facility] Crisis Line Number: [phone]     Other things that are important when I m in crisis: this will pass, I am in control of my body, I can ask for help     Additional resources and information:   \"Mindfulness \" (published by the [employer]) is one of many apps that offer guided mindfulness practices. This brigitte is available for free with unlimited access to dozens of evidence based practices, known to have a positive effect on anxiety and depression symptoms.     Mindfulness practices teach your brain to stay in the moment. Anxiety and depression symptoms trick your brain into living in the past or future: worrying about unknown outcomes or predicting worst case scenarios, reflecting with sadness, guilt or shame on past events which have caused you pain. When the mind is chasing these past and future events, you are not fully present in the moment.     More to the point, these past and future situations are outside of your control. Mindfulness is training your brain to be present and makes you better able to manage yourself in the moment you are in - the present moment.     Other apps people have found to be helpful are also free to down load but may have some restrictions to the content you are able to view, such as needing a subscription for unrestricted access. You may wish to try the following:   10% Happier  Buddhify  Calm  Insight Timer  Headspace                                                              "

## 2021-10-18 NOTE — ED PROVIDER NOTES
EmPATH Unit - Psychiatric Consultation  [facility] Emergency Department    Laurence Torres MRN: [id number]   Age: 26 year old YOB: 1995     History     Chief Complaint   Patient presents with     Psychiatric Evaluation     pt is postpartum three days - hx of postpartum depression 8 years ago, woke up tonight feeling severe anxiety and fear and called ambulance. denies si/hi     HPI  Laurence Torres is a 26 year old female with history notable for a single episode of postpartum depression several years ago.  She presented to the emergency room for evaluation of a severe panic attack and concerned that another depressive episode may be emerging.  She gave birth to her second child a few days ago.  Records indicate that the patient awoke abruptly in the midst of a significant anxiety and panic attack.  No associated psychosocial stressors were identified although the patient noted that she maintains the majority of the household and childcare responsibilities.  In the emergency room, she was noted to be medically stable and transferred to the empath unit for psychiatric assessment.  On examination today, she reviews with me her depressive episode which occurred approximately 8 years ago shortly after the birth of her first child.  She was not started on medications at that time however struggled through the symptoms that were present until full remission occurred.  As she did not wish to repeat a similar reoccurrence following the birth of her second child, she met with her outpatient provider and was started on Zoloft which she has taken over the past 2 days.  She has not noted any significant side effects.  This is her first antidepressant medication trial.  She started 25 mg daily and reports a discussion involving a gradual dose titration over the upcoming weeks.  Today, she is inquiring regarding medications to help alleviate anxiety in the interim as she is aware Zoloft may take a few weeks to  "provide full therapeutic benefit.  The patient is breast-feeding her child.  She does not desire any medications that could potentially be habit forming.  She denied suicidal thoughts.  She denied homicidal thoughts or any hostile or negative thoughts towards her baby.  She anticipates discharging home after our meeting today.    Past Medical History  Past Medical History:   Diagnosis Date     Abnormal Pap smear of cervix      Herpes simplex virus (HSV) infection     HSV2 (Diagnosed 1 year ago - asymptomatic)     Postpartum depression      Urinary tract infection      Past Surgical History:   Procedure Laterality Date     EXTRACTION(S) DENTAL      [place]     hydrOXYzine (VISTARIL) 25 MG capsule  sertraline (ZOLOFT) 25 MG tablet      No Known Allergies  Family History  Family History   Problem Relation Age of Onset     Lung Cancer Maternal Grandmother      Social History   Social History     Tobacco Use     Smoking status: Former Smoker     Types: Vaping Device     Quit date: 2020     Years since quittin.7     Smokeless tobacco: Never Used   Substance Use Topics     Alcohol use: Never     Drug use: Never      Past medical history, past surgical history, medications, allergies, family history, and social history were reviewed with the patient. No additional pertinent items.       Review of Systems  A complete review of systems was performed with pertinent positives and negatives noted in the HPI, and all other systems negative.    Physical Examination   BP: 121/80  Pulse: 62  Temp: 98.3  F (36.8  C)  Resp: 16  Height: 172.7 cm (5' 8\")  Weight: 75.2 kg (165 lb 11.2 oz)  SpO2: 98 %    Physical Exam  General: Appears stated age.   Neuro: Alert and fully oriented. Extremities appear to demonstrate normal strength on visual inspection.   Integumentary/Skin: no rash visualized, normal color    Psychiatric Examination   Appearance: awake, alert  Attitude:  cooperative  Eye Contact:  fair  Mood:  anxious and " better  Affect:  mood congruent  Speech:  clear, coherent  Psychomotor Behavior:  no evidence of tardive dyskinesia, dystonia, or tics  Thought Process:  logical and linear  Associations:  no loose associations  Thought Content:  no evidence of suicidal ideation or homicidal ideation and no evidence of psychotic thought  Insight:  fair  Judgement:  intact  Oriented to:  time, person, and place  Attention Span and Concentration:  fair  Recent and Remote Memory:  fair  Language: able to name/identify objects without impairment  Fund of Knowledge: intact with awareness of current and past events    ED Course        Labs Ordered and Resulted from Time of ED Arrival Up to the Time of Departure from the ED   COVID-19 VIRUS (CORONAVIRUS) BY PCR - Normal    Narrative:     Testing was performed using the mirna  SARS-CoV-2 & Influenza A/B Assay on the mirna  Stephanie  System.  This test should be ordered for the detection of SARS-COV-2 in individuals who meet SARS-CoV-2 clinical and/or epidemiological criteria. Test performance is unknown in asymptomatic patients.  This test is for in vitro diagnostic use under the FDA EUA for laboratories certified under CLIA to perform moderate and/or high complexity testing. This test has not been FDA cleared or approved.  A negative test does not rule out the presence of PCR inhibitors in the specimen or target RNA in concentration below the limit of detection for the assay. The possibility of a false negative should be considered if the patient's recent exposure or clinical presentation suggests COVID-19.  [facility] are certified under the Clinical Laboratory Improvement Amendments of 1988 (CLIA-88) as qualified to perform moderate and/or high complexity laboratory testing.       Assessments & Plan (with Medical Decision Making)   Patient presenting with heightened anxiety and concern for a future relapse of postpartum depression following the birth of her second child a  few days ago. Nursing notes reviewed noting no acute issues.     I have reviewed the assessment completed by the [facility].     Preliminary diagnosis:    ICD-10-CM    1. Adjustment disorder with anxious mood  F43.22    2. Major depressive disorder, single episode, in remission  F32.5 history of post-partum onset        Treatment Plan:  -Continue Zoloft 25 mg daily for 1 week then increase to 50 mg daily for more effective reduction of anxiety while aiming to minimize the risk of progressing to another depressive episode in the postpartum state.  Risks and benefits were reviewed in the context of the patient's plan to continue breast-feeding.  -Begin hydroxyzine 25 mg 3 times daily as needed for management of anxiety until Zoloft is able to provide the patient with a therapeutic benefit.  Risks of this medication were also reviewed in the context of breast-feeding.  -Referral for individual therapy  -Resume outpatient medication management  -Discharge home today.    After a period of working with the treatment team on the EmPATH unit, the patient's mental state improved to allow a safe transition to outpatient care. After counseling on the diagnosis, work-up, and treatment plan, the patient was discharged. Close follow-up with a psychiatrist and/or therapist was recommended and community psychiatric resources were provided. Patient is to return to the ED if any urgent or potentially life-threatening concerns.     At the time of discharge, the patient's acute suicide risk was determined to be low due to the following factors: Reduction in the intensity of mood/anxiety symptoms that preceded the admission, denial of suicidal thoughts, denies feeling helpless or helpless, not currently under the influence of alcohol or illicit substances, denies experiencing command hallucinations, no immediate access to firearms. The patient's acute risk could be higher if noncompliant with their treatment plan, medications, follow-up  appointments or using illicit substances or alcohol. Protective factors include: social supports, children, stable housing      --  Adalberto Cummings MD   [facility] EMERGENCY DEPT  EmPATH Unit  10/18/2021      Adalberto Cummings MD  10/18/21 [id number]

## 2021-10-18 NOTE — CONSULTS
"10/18/2021  Laurence Torres 1995     [facility] Mental Health Assessment:    Started at: 2:20am   Completed at: 3:05am   What type of assessment are you doing today? Crisis assessment    1.  Presenting Problem:      Referral Method to ED? Medics     What brings the patient to the ED today?     Laurence is a 26 year old female, goes by she/her pronouns, presents to the ED via EMS with symptoms of anxiety and depression, which she refers to as \"the baby blues\". Patient reports she gave birth to a baby boy 3 days ago.  She states tonight she was sleeping, abruptly woke up and felt a rush of panic, fear, and anxiety.  Patient reported she felt \"light headed and foggy\", and had an overwhelming \"sense that something was wrong\".  She believes she experienced a panic attack. She said she initially told her boyfriend how she was feeling, she then began to hold onto to him and asked repeatedly for him not to leave her.  After a short while the patient states her symptoms had not improved, so she asked her boyfriend to call 911, she was transported by EMS to the ED.       Overall, patient states she has been stressed and says \"I feel like everything is on me, everybody depends on me\".  Patient reports she has the  baby and an 8 year old son, she says her boyfriend is a new father and he needs help learning how to take care of the baby and says \"he's learning everything, I have to show him how to do everything, he can't be there for me\".  She states her boyfriend does not drive, so she is responsible to do most of the care-taking for the children, including dropping off and picking up her oldest son from school.  She reports financial stress as she covers most of the family's expenses.  She says she is constantly taking care of things in the home and has no time for self-care.  Patient reported she struggled with post-partum depression after the birth of her oldest son as well, she stated she was very depressed and " "anxious for the first 2 weeks, then she said things started to get back to normal after that initial period.  Patient reported she was fearful to have another child for many years because \"I didn't want to go through that again\". Patient was prescribed zoloft when she left the hospital 3 days ago, she began taking the medication but was notified the medication may take 4-6 weeks to begin to be effective.  Patient is very upset by this, she does not understand why the provider waited to start her on the medication until after birth, after she had already reported her history of post-partum depression.   Patient is cooperative throughout the interview, she is very teary and sad.   Patient denies SI, HI, and SIB.  Patient denies having access to guns or weapons.      Patient states she has a history of episodes of feeling anxious, she says \"I don't handle bad news very well\".  Patient reports she previously saw a therapist for a short while in 2018.  She states this was helpful, but discontinued once the anxiety symptoms lessened.  Patient has a midwife provider through [facility].  Patient denies having a PCP, therapist, or psychiatry provider.  Patient expresses she is willing to schedule an appointment for individual therapy, and expresses wanting to meet with the provider at [facility] for psychiatric consult.  Patient is currently employed at [employer], she is on maternity leave for the next 6 weeks before she is expected to return.  Patient denies any psychiatric history, no previous diagnosis or psychiatric admissions.  Patient denies alcohol and drug use.  Patient denies any history of trauma, and denies any symptoms of psychosis.    Has this happened before? No    Duration of presenting problem: 1 day     Additional Stressors: pregnancy and recent birth, financial stress, caregiver fatigue, symptoms of depression and anxiety     2.  Risk Assessment:  Suicide and Self-Harm    ESS-6  1.a. Over the past 2 " weeks, have you had thoughts of killing yourself? No- patient denies   1.b. Have you ever attempted to kill yourself and, if yes, when did this last happen? No- patient denies   2. Recent or current suicide plan? No- patient denies   3. Recent or current intent to act on ideation? No- patient denies   4. Lifetime psychiatric hospitalization? No- patient denies   5. Pattern of excessive substance use? No- patient denies   6. Current irritability, agitation, or aggression? No- patient denies   ESS-6 Score: 0    SI: N/A  Plan: No  Intent: No   Prior Attempts: No     Protective Factors: patient reports she has supportive parents, they often offer assistance with the children and her mother states she can move home at anytime if needed, patient has an established midwife provider for follow-up, patient voluntarily seeking help     Hopes and goals for the future: none identified     Coping Skills: What helps and doesn't help? Patient reported therapy has been helpful in the past     Additional Risk Factors Related to Safety and Suicide: Yes: Lack of support    Is the patient engaged in self injurious behaviors? No; patient denies     Risk to Others    Aggressive/Assaultive/Homicidal Risk Factors: No     Duty to Warn? No     Was a Child Protection Report Made? No; patient reported her children are home with her boyfriend, denies any safety concerns        Was a Adult Protection Report Made? No        Sexually inappropriate behavior? No        Vulnerability to sexual exploitation? No     Additional information: patient denies HI, patient denies having access to guns or weapons       3. Mental Health Symptoms and Substance Use  Current Symptoms and Mental Health History    GAIN Short Screener (GAIN-SS) administered? NA    Attention, Hyperactivity, and Impulsivity Symptoms      Patient reported symptoms related to hyperactivity, inattention, or impulsivity? No       Anxiety Symptoms    Patient reported anxiety symptoms? Yes:  Panic attacks and Generalized Symptoms: Cognitive anxiety - feelings of doom, racing thoughts, difficulty concentrating , Excessive worry, Physiological anxiety - sweating, flushing, shaking, shortness of breath, or racing heart and Somatic symptoms - abdominal pain, headache, or tension         Behavioral Difficulties    Patient reported behavioral difficulties? No       Mood Symptoms    Patient reported mood disorder symptoms? Yes: Crying or feels like crying and Sad, depressed mood        Eating Disorders and Appetite Disturbance      Patient reported appetite symptoms? No       SCOFF  Do you make yourself sick (induce vomiting) because you feel uncomfortably full? No   Do you worry that you have lost Control over how much you eat? No  Have you recently lost more than 14 lb in a three-month period? No   Do you think you are too fat, even though others say you are too thin? No   Would you say that food dominates your life? No  SCOFF Score: 0    Patient reported appetite or eating disorder symptoms? No      Interpersonal Functioning     Patient reported difficulties that may be associated with personality and interpersonal functioning? Yes: Emotional Deregulation      Learning Disabilities/Cognitive/Developmental Disorders    Patient reported concerns related to learning disabilities, cognitive challenges, and/or developmental disorders? No       General Cognitive Impairments    Patient reported symptoms of cognitive impairments? No  If yes, complete Mini-Cog Assessment.    Sleep Disturbance    Patient reported difficulties with sleep? Yes: Difficulty falling asleep    Patient reports sleeping for 2 hour increments, she states she wakes up to feed and change the baby, she tries to rest when the baby is sleeping      Psychosis Symptoms    Patient reported symptoms of psychosis? No        Trauma and Post-Traumatic Stress Disorder    Physical Abuse: No   Emotional/Psychological Abuse: No  Sexual Abuse: No  Loss of a  friend or family member to suicide: No  Other Traumatic Event: No     Patient reported trauma related symptoms? No       Impact of Mental Health on Functioning      Negative Impact Score: 7/10   Subjective Impact on functioning: patient reports increased anxiety and depression, she reports experiencing a panic attack tonight   How do symptoms vary from baseline? Patient denies having a history of panic attack, at baseline experiences some anxiety, but typically does not affect her overall functioning     Current and Historical Substance Use Note:    IIs there a history of, or current, substance use? No     Have you been to chemical dependency treatment or detox before? No     CAGE-AID    Have you felt you ought to cut down on your drinking or drug use? No     Have people annoyed you by criticizing your drinking or drug use? No   Have you felt bad or guilty about your drinking or drug use? No  Have you ever had a drink or used drugs first thing in the morning to steady your nerves or to get rid of a hangover? No   CAGE-AID Score: 0/4    Drug screen completed? No   BAL/Breathalyzer completed? No       Mental Status Exam:    Affect: Flat  Appearance: Appropriate   Attention Span/Concentration: Attentive    Eye Contact: Variable  Fund of Knowledge: Appropriate   Language /Speech Content: Fluent  Language /Speech Volume: Soft   Language /Speech Rate/Productions: Normal and Slow   Recent Memory: Intact  Remote Memory: Intact  Mood: Anxious, Depressed and Sad   Orientation:   Person: Yes   Place: Yes  Time of Day: Yes   Date: Yes   Situation (Do they understand why they are here?): Yes   Psychomotor Behavior: Normal   Thought Content: Clear  Thought Form: Intact    4. Social and Environmental Conditions   Is the patient their own guardian? Yes    Living Situation: With others: patient lives with her boyfriend,  baby, and 8 year old son     Support system and quality of connections: patient reports her parents are  supportive and reliable     Income source: Employment: patient works at [employer]     Issues with employment or education: patient is currently on maternity leave for 6 weeks     Legal Concerns  Do you have any history of or current involvement with the legal system? No    Spiritual and Cultural Influences  Do you have any Yazidi beliefs that are important in your life? No     Do you have any cultural influences in your life that impact your mental health care? No        5. Psychiatric History, Medical History, and Current Care      Patient Mental Health Services   Does the patient have a history of mental health concerns/diagnoses? Yes anxiety     Current Providers  Primary Care Provider: No   Psychiatrist: No   Therapist: No   : No   [facility]: No   ACT Team: No   Other: Yes midwife at [facility]    History of Commitment? No  History of Psychiatric Hospitalizations? No   History of programmatic care? No    Family Mental Health History   Family History of Mental Health or Chemical Dependency Issues? No     Development and Physical Health Challenges  Delays or concerns meeting developmental milestones? No  Current psychotropic medications? Yes zoloft    Medication Compliant? Yes   Recent medication changes? No    History of concussion or TBI? No     Additional Information: NA    6. Collateral Information and Collaboration    Collaboration with medical staff:Referral Information:   Medical Records and Nursing     Collateral Information/Sources: Family: Patient's mother Trina      The following information was received from Trina whose relationship to the patient is mother. Information was obtained by phone call. Their phone number is [phone] and they last had contact with patient when they spoke with her earlier today.    What happened today: Trina states the patient is experiencing post partum depression, she reports the patient experienced this with her first  "pregnancy 8 years ago.  Trina is concerned the patient is away from her 3 day old baby, but also wants her to get help.  Trina reports she and the patient's father are willing to help as well- they can help with her older son and she stated the patient is able to move in with her or her father at anytime for additional support.      What is different about patient's functioning: Trina states the patient told her she is depressed and anxious and cannot stop crying.      Concern about alcohol/drug use: No    What do you think the patient needs: Trina feels the patient would benefit from a short stay and psychiatric consult, but would like for her to return home to care for the baby as soon as possible     Has patient made comments about wanting to kill themselves/others:  No    If d/c is recommended, can they take part in safety/aftercare planning: Yes Trina states she can assist the patient by helping to support her with \"anything she needs\", and also stated she can return home to live with her or her father if needed.     7. Assessment and Diagnosis  Assessment of patient strengths and vulnerabilities    Strengths, Protective Factors, & Community Resources: patient reports she has supportive parents, they often offer assistance with the children and her mother states she can move home at anytime if needed, patient has established midwife provider for follow-up, patient voluntarily seeking help       Patient skills, abilities, and coping skills (what is going well?): Patient is voluntarily seeking help, patient expresses willingness to schedule individual therapy appointment     Patient vulnerabilities: pregnancy and recent birth, financial stress, caregiver fatigue, symptoms of depression and anxiety     Diagnosis         Unspecified anxiety disorder F41.9       Unspecified depressive disorder F32.9      8.Therapeutic Methodologies Utilized in Assessment    Psychotherapy techniques and/or " interventions used: Establishing rapport, Active listening and Assess dimensions of crisis    9. Patient Care/Treatment Plan  Summary of Patient Presentation and needs  What are the basic needs for this patient in this moment? Patient will remain on EmPath Unit overnight for further observation and evaluation, patient will meet with the attending provider and participate in psychiatric consult in the AM       Consultations :  Attending provider consulted? Consult is still in process at the time of writing this note.  Information from this assessment will be communicated to the attending provider.   Attending Name: Dr. Cummings    Attending concurs with disposition? Consult still in process     Recommended disposition: Individual Therapy     Does the patient agree with the recommended level of care? Yes    Final disposition: Individual therapy     Disposition Details: Patient will remain on EmPath Unit overnight for further observation and evaluation, patient will meet with the attending provider and participate in psychiatric consult in the AM       If Inpatient, is patient admitted voluntary? N/A   Patient aware of potential for transfer if there is not appropriate placement? NA  Patient is willing to travel outside of the [facility] for placement? NA   Central Intake Notified? NA    10. Patient Care Document: Safety and After Care Planning:          Safety Plan Provided? Safety plan will be provided upon discharge     Follow-Up Plans and Providers: TBD    Follow-Up Plan:  After care plan provided to the patient/guardian by: NA  After care plan provided to any additional sources/parties? No    Duration of face to face time with patient in minutes: .75 hrs    CPT code(s) utilized: 94323 - Psychotherapy for Crisis - 60 (30-74*) min      Meera Marin

## 2021-10-18 NOTE — ED NOTES
"  EmPATH Treatment Plan    Client's Name: Laurence Torres  YOB: 1995    DSM-5 Diagnoses:      Unspecified anxiety disorder          F41.9                                         Unspecified depressive disorder    F32.9    Psychosocial / Contextual Factors: Patient presented to the emPATH unit with the following concerns: Laurence is a 26 year old female, goes by she/her pronouns, presents to the ED via EMS with symptoms of anxiety and depression, which she refers to as \"the baby blues\". Patient reports she gave birth to a baby boy 3 days ago.  She states tonight she was sleeping, abruptly woke up and felt a rush of panic, fear, and anxiety.  Patient reported she felt \"light headed and foggy\", and had an overwhelming \"sense that something was wrong\".  She believes she experienced a panic attack. She said she initially told her boyfriend how she was feeling, she then began to hold onto to him and asked repeatedly for him not to leave her.  After a short while the patient states her symptoms had not improved, so she asked her boyfriend to call 911, she was transported by EMS to the ED.       Anticipated number of sessions or this episode of care: 1-4    MeasurableTreatment Goal(s) related to diagnosis / functional impairment(s)    Goal 1: Patient will increase awareness of anxiety and their impact on functioning and develop skills to reduce negative impact.    Objective #A     Patient will describe thoughts, feelings, and actions associated with anxiety.  Status: New as of October 18, 2021    Intervention(s)  [facility] will explore and process with patient how anxiety has impacted them.      Objective #B  Patient will increase distress tolerance coping skills.  Status: New as of October 18, 2021    Intervention(s)  [facility] will teach DBT skills and model their use.      Goal 2:Patient will increase awareness of depression symptoms and their impact on functioning and develop skills to reduce negative " impact.     Objective #A     Patient will describe thoughts, feelings, and actions associated with depression.    Status: New as of October 18, 2021    Intervention(s)  [facility] will explore and process with patient how depression has impacted them.      Objective #B  Patient will  increase depression coping skills.    Status: New as of October 18, 2021    Intervention(s)  [facility] will teach CBT skills and model their use.            Appearance:   Appropriate    Eye Contact:   Fair    Psychomotor Behavior: Normal    Attitude:   Cooperative    Orientation:   All   Speech    Rate / Production: Normal/ Responsive Slow  Normal     Volume:  Soft    Mood:    Anxious  Depressed  Sad    Affect:    Flat    Thought Content:  Clear    Thought Form:  Coherent    Insight:    Fair           PLAN:   Patient will board in emPATH until patient and treatment deem it appropriate to either discharge or admit to a higher level of care.      Meera Marin                                                           ________

## 2021-10-18 NOTE — ED NOTES
Patient denies suicidal ideation. Reviewed safety plan, follow up care plan and medication regime. Patient verbalizes understanding of them.Reviewed discharge instructions with patient.  Patient has a copy of the AVS and verbalizes understanding of them. Patient's belongings sent along with the patient. Patient escorted off the unit. Patient discharged to home  with family at 1040.

## 2021-10-28 ENCOUNTER — MYC MEDICAL ADVICE (OUTPATIENT)
Dept: MIDWIFE SERVICES | Facility: CLINIC | Age: 26
End: 2021-10-28

## 2021-10-28 ENCOUNTER — TELEPHONE (OUTPATIENT)
Dept: MIDWIFE SERVICES | Facility: CLINIC | Age: 26
End: 2021-10-28

## 2021-10-28 NOTE — TELEPHONE ENCOUNTER
Called Laurence to check-in. She was seen in the ER on 10/18 for panic attack and depressive symptoms. She was started on Zoloft 25 mg upon discharge from the hospital after having her baby on 10/15/21. She had an appt with the midwife on 10/26 but did not come to scheduled appointment. I called her x2 and reached her VM, unable to leave message d/t full inbox. I sent her a ScreenTag message checking in and encouraged her to make appt with midwife ASAP for mood check.     ALIYA Denny, OSWALDOM

## 2021-10-29 NOTE — TELEPHONE ENCOUNTER
Please call patient and schedule an appointment sooner.  Thanks!    Elaine PISANO CNM, [facility]  [phone]

## 2021-11-08 ENCOUNTER — VIRTUAL VISIT (OUTPATIENT)
Dept: MIDWIFE SERVICES | Facility: CLINIC | Age: 26
End: 2021-11-08
Payer: COMMERCIAL

## 2021-11-08 DIAGNOSIS — Z87.59 HISTORY OF POSTPARTUM DEPRESSION: ICD-10-CM

## 2021-11-08 DIAGNOSIS — Z86.59 HISTORY OF POSTPARTUM DEPRESSION: ICD-10-CM

## 2021-11-08 PROBLEM — Z36.89 ENCOUNTER FOR TRIAGE IN PREGNANT PATIENT: Status: RESOLVED | Noted: 2021-10-14 | Resolved: 2021-11-08

## 2021-11-08 PROCEDURE — 99207 PR POST PARTUM EXAM: CPT | Performed by: ADVANCED PRACTICE MIDWIFE

## 2021-11-08 RX ORDER — SERTRALINE HYDROCHLORIDE 25 MG/1
25 TABLET, FILM COATED ORAL DAILY
Qty: 90 TABLET | Refills: 0 | Status: SHIPPED | OUTPATIENT
Start: 2021-11-08 | End: 2023-05-01

## 2021-11-08 ASSESSMENT — ANXIETY QUESTIONNAIRES
5. BEING SO RESTLESS THAT IT IS HARD TO SIT STILL: NOT AT ALL
3. WORRYING TOO MUCH ABOUT DIFFERENT THINGS: NOT AT ALL
2. NOT BEING ABLE TO STOP OR CONTROL WORRYING: NOT AT ALL
IF YOU CHECKED OFF ANY PROBLEMS ON THIS QUESTIONNAIRE, HOW DIFFICULT HAVE THESE PROBLEMS MADE IT FOR YOU TO DO YOUR WORK, TAKE CARE OF THINGS AT HOME, OR GET ALONG WITH OTHER PEOPLE: NOT DIFFICULT AT ALL
7. FEELING AFRAID AS IF SOMETHING AWFUL MIGHT HAPPEN: NOT AT ALL
6. BECOMING EASILY ANNOYED OR IRRITABLE: SEVERAL DAYS
1. FEELING NERVOUS, ANXIOUS, OR ON EDGE: NOT AT ALL
GAD7 TOTAL SCORE: 2

## 2021-11-08 ASSESSMENT — PATIENT HEALTH QUESTIONNAIRE - PHQ9: 5. POOR APPETITE OR OVEREATING: SEVERAL DAYS

## 2021-11-08 NOTE — PROGRESS NOTES
SUBJECTIVE: Laurence Torres  is here for a 6-week postpartum checkup.    Patient Active Problem List   Diagnosis     Pregnancy, supervision, high-risk     Acne vulgaris     ASCUS with positive high risk HPV cervical     Asthma     Mild intermittent asthma     HSV-2 seropositive     History of postpartum depression     History of suicidal ideation     Anemia affecting pregnancy in third trimester     Major depressive disorder, single episode, in remission (H)     Past Medical History:   Diagnosis Date     Abnormal Pap smear of cervix      Herpes simplex virus (HSV) infection     HSV2 (Diagnosed 1 year ago - asymptomatic)     Postpartum depression      Urinary tract infection      Current Outpatient Medications   Medication Sig Dispense Refill     hydrOXYzine (VISTARIL) 25 MG capsule Take 1 capsule (25 mg) by mouth 3 times daily as needed for anxiety 30 capsule 0     sertraline (ZOLOFT) 25 MG tablet Take 1 tablet (25 mg) by mouth daily for one week then increase to 50mg (2-tablets) daily 90 tablet 0     ROS:  Delivery date was 10/329368. She had a  of a viable boy, weight 7 pounds 9 oz., with no complications.   She is not breast feeding Bleeding  has stopped and there are no signs or symptoms of endometritis.  Patient  is screened for postpartum depression and is  negative. Denies stress incontinence.  No constipation, pain or bleeding with bowel movements.       EXAM:  LMP 2021       ASSESSMENT:   Normal postpartum exam.    PLAN:  (Z87.59,  Z86.59) History of postpartum depression  Comment:   Plan: sertraline (ZOLOFT) 25 MG tablet          Is not taking her zoloft regularly.  And is not interested in increasing her dose to 50 mg.   She is worried about stopping the medication.  Denies every having taking an antidepressant in the past.   She doesn't feel that she has depression, but occasional anxiety.   Has follow up appointment in about a week.  Refill provided for her.       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     Appointment start  0950 and end 0959 for a total of 9 denise

## 2021-11-09 ASSESSMENT — PATIENT HEALTH QUESTIONNAIRE - PHQ9: SUM OF ALL RESPONSES TO PHQ QUESTIONS 1-9: 1

## 2021-11-09 ASSESSMENT — ANXIETY QUESTIONNAIRES: GAD7 TOTAL SCORE: 2

## 2021-11-16 ASSESSMENT — ENCOUNTER SYMPTOMS
FEVER: 0
ABDOMINAL PAIN: 0
SHORTNESS OF BREATH: 0

## 2021-11-16 NOTE — ED PROVIDER NOTES
History     Chief Complaint:  Psychiatric Evaluation     HPI   Laurence Torres is a 26 year old female who presents feeling of anxiety and depression.  She reports she had significant post partum depression following her prior pregnancy.  She had another child a few days ago.  She had spoken to her OB about her prior post partum depression, but feels like her symptoms have returned and she is worried for a further worsening.  She denies specific SI and no HI.    Allergies:  No Known Allergies     Medications:    sertraline (ZOLOFT) 25 MG tablet        Past Medical History:    Past Medical History:   Diagnosis Date     Abnormal Pap smear of cervix      Herpes simplex virus (HSV) infection      Postpartum depression      Urinary tract infection        Patient Active Problem List    Diagnosis Date Noted     Major depressive disorder, single episode, in remission (H) 10/18/2021     Priority: Medium     history of post-partum onset       Anemia affecting pregnancy in third trimester 08/27/2021     Priority: Medium     Acne vulgaris 04/05/2021     Priority: Medium     Asthma 04/05/2021     Priority: Medium     HSV-2 seropositive 04/05/2021     Priority: Medium     Diagnosed 2020, asymptomatic       History of postpartum depression 04/05/2021     Priority: Medium     History of suicidal ideation 04/05/2021     Priority: Medium     In May 2020 (see ED note)       Pregnancy, supervision, high-risk 04/02/2021     Priority: Medium     **HR  FOB: [place]   GINA: Oct 19, 2021  O+Placenta:anterior   Sex: BOY  Innatal negative  Tdap: 7/30  FLU: 9/24 GBS: neg  Hx: frequent BV, PP Depression, asthma  HSV2-prophylaxis @36w  COVID vax   1 Hr GCT/hgb: Passed 92, 9.9       ASCUS with positive high risk HPV cervical 02/20/2018     Priority: Medium     Mild intermittent asthma 07/31/2011     Priority: Medium        Past Surgical History:    Past Surgical History:   Procedure Laterality Date     EXTRACTION(S) DENTAL      [place] Teeth         Family History:    family history includes Lung Cancer in her maternal grandmother.    Social History:   reports that she quit smoking about 10 months ago. Her smoking use included vaping device. She has never used smokeless tobacco. She reports that she does not drink alcohol and does not use drugs.    PCP: No Ref-Primary, Physician     Review of Systems   Constitutional: Negative for fever.   Respiratory: Negative for shortness of breath.    Cardiovascular: Negative for chest pain.   Gastrointestinal: Negative for abdominal pain.   All other systems reviewed and are negative.        Physical Exam   No data found.     Physical Exam  General: Appears well-developed and well-nourished.   Head: No signs of trauma.   CV: Normal rate and regular rhythm.    Resp: Effort normal and breath sounds normal. No respiratory distress.   GI: Soft. There is no tenderness.  No rebound or guarding.  Normal bowel sounds.    MSK: Normal range of motion.   Neuro: The patient is alert and oriented.  Speech normal.  Skin: Skin is warm and dry. No rash noted.   Psych:  Somewhat anxious, but calm and cooperative    Emergency Department Course     Laboratory:    Labs Ordered and Resulted from Time of ED Arrival to Time of ED Departure   COVID-19 VIRUS (CORONAVIRUS) BY PCR - Normal       Result Value    SARS CoV2 PCR Negative            Emergency Department Course:  Past medical records, nursing notes, and vitals reviewed.  I performed an exam of the patient and obtained history, as documented above.    I rechecked the patient. Findings and plan explained to the Patient.  Pt transferred to EmPATH    Impression & Plan      Medical Decision Making:  Pt presents with anxiety and depression feeling and concerns for post partum depression.  She reports fairly significant post partum depression after her first child.  She had another child a few days ago and is worried for worsening symptoms, prompting her to come to the ER.  Pt was  medically cleared and sent to the EmPATH unit for further psychiatric evaluation and care.    Diagnosis:    ICD-10-CM    1. Adjustment disorder with anxious mood  F43.22    2. History of postpartum depression  Z87.59 DISCONTINUED: sertraline (ZOLOFT) 25 MG tablet    Z86.59          11/16/2021   No att. providers found          Arsen Arreola MD  11/16/21 [id number]

## 2021-11-19 ENCOUNTER — TELEPHONE (OUTPATIENT)
Dept: OBGYN | Facility: OTHER | Age: 26
End: 2021-11-19

## 2021-11-19 NOTE — TELEPHONE ENCOUNTER
Type of Paperwork received:  FMLA     Date Rcvd:  11/19/2021    Rcvd From (Company name): [employer]  Department    Provider:  Midwives (Dot Garcia)    Placed on Provider Cart Date:  11/19/2021

## 2021-11-30 ENCOUNTER — MYC MEDICAL ADVICE (OUTPATIENT)
Dept: MIDWIFE SERVICES | Facility: CLINIC | Age: 26
End: 2021-11-30
Payer: COMMERCIAL

## 2022-05-01 ENCOUNTER — HEALTH MAINTENANCE LETTER (OUTPATIENT)
Age: 27
End: 2022-05-01

## 2022-11-21 ENCOUNTER — HEALTH MAINTENANCE LETTER (OUTPATIENT)
Age: 27
End: 2022-11-21

## 2023-04-25 NOTE — PROGRESS NOTES
Laurence is a 28 year old  female who presents for annual exam.     Besides routine health maintenance,  she would like to discuss Swelling of the labia.  HPI: here for an annual exam. States she has a cyst on her vulva that she has had for a few months. She has one on the other side that lasted for 6 years before it popped. States it is not painful. She once asked a doctor to remove it and they declines because they stated it was cosmetic.   Additionally she wants STI testing, is due for her pap screen, and wants to make sure she doesn't have yeast or BV.   She states she has anxiety, but manages it and is not interested in interventions.   Menses are regular q 28-30 days and normal.  Patient's last menstrual period was 2023 (exact date)..   Using none for contraception.    She is not currently considering pregnancy.    REPRODUCTIVE/GYNECOLOGIC HISTORY:  Laurence is not sexually active with male partner(s) and is currently in monogamous relationship.   STI testing offered?  Accepted  History of abnormal Pap smear:  No    She  reports that she quit smoking about 2 years ago. Her smoking use included vaping device. She has never used smokeless tobacco.      Last PHQ-9 score on record =       2023     9:25 AM   PHQ-9 SCORE   PHQ-9 Total Score 0     Last GAD7 score on record =       2023     9:25 AM   ROBERTA-7 SCORE   Total Score 8     Alcohol Score = 0    HEALTH MAINTENANCE:  No results for input(s): CHOL, HDL, LDL, TRIG, CHOLHDLRATIO in the last 17868 hours.  Pap: Due 2020  Mammo: Due at age 40  Colonoscopy: Due at age 47  Dexa: Due at age 67    Overdue          Never   Done ASTHMA ACTION PLAN (Yearly)     Never   Done ASTHMA CONTROL TEST (Every 6 Months)     Never   Done ADVANCE CARE PLANNING (Every 5 Years)     Never   Done DEPRESSION ACTION PLAN (Once)     Never   Done COVID-19 Vaccine (1)     FEB 3   2021 YEARLY PREVENTIVE VISIT (Yearly)   Last completed: Feb 3, 2020 Done today     MAY 8     PHQ-9 (Every 6 Months)  Last completed: 2021 Done today     SEP 1   2022 INFLUENZA VACCINE (1)  Last completed: Sep 24, 2021     FEB 3   2023 PAP (Every 3 Years)  Last completed: Feb 3, 2020 Done today        Upcoming          2031 DTAP/TDAP/TD IMMUNIZATION (9 - Td or Tdap)  Last completed: 2021         HISTORY:  OB History    Para Term  AB Living   3 2 2 0 1 2   SAB IAB Ectopic Multiple Live Births   0 0 0 0 2      # Outcome Date GA Lbr Jeyson/2nd Weight Sex Delivery Anes PTL Lv   3 Term 10/15/21 39w3d 29:50 / 00:20 3.44 kg (7 lb 9.3 oz) M Vag-Spont EPI N HEIDI      Name: ASHLEY BARNESGaudencioCRISTOPHER      Apgar1: 8  Apgar5: 9   2 AB 2014     IAB      1 Term 13 40w3d  3.515 kg (7 lb 12 oz) M Vag-Spont EPI N HEIDI      Birth Comments: System Generated. Please review and update pregnancy details.      Name: Celestine      Apgar1: 8  Apgar5: 9     Past Medical History:   Diagnosis Date     Abnormal Pap smear of cervix      Herpes simplex virus (HSV) infection     HSV2 (Diagnosed 1 year ago - asymptomatic)     Postpartum depression      Urinary tract infection      Past Surgical History:   Procedure Laterality Date     EXTRACTION(S) DENTAL      [place] Teeth     Family History   Problem Relation Age of Onset     Lung Cancer Maternal Grandmother      Social History     Socioeconomic History     Marital status: Single   Tobacco Use     Smoking status: Former     Types: Vaping Device     Quit date: 2020     Years since quittin.3     Smokeless tobacco: Never   Substance and Sexual Activity     Alcohol use: Never     Drug use: Never     Sexual activity: Yes     Partners: Male     Birth control/protection: None     No current outpatient medications on file.   No Known Allergies    Past medical, surgical, social and family history were reviewed and updated in EPIC.    ROS:   12 point review of systems negative other than symptoms noted below or in the HPI.  Genitourinary: Vaginal  "Discharge and cyst on vulva    PHYSICAL EXAM:  BP (!) 110/90   Ht 1.724 m (5' 7.87\")   Wt 60.2 kg (132 lb 12.8 oz)   LMP 04/28/2023 (Exact Date)   BMI 20.27 kg/m     BMI: Body mass index is 20.27 kg/m .  Constitutional: healthy, alert and no distress  Neck: symmetrical, thyroid normal size, no masses present, no lymphadenopathy present.   Cardiovascular: RRR, no murmurs present  Respiratory: breathing unlabored, lungs CTA bilaterally  Breast:normal without masses, tenderness or nipple discharge and no palpable axillary masses or adenopathy  Gastrointestinal: abdomen soft, non-tender, bowel sounds present  PELVIC EXAM:  Vulva: No lesions, no adenopathy, BUS WNL. 0.5-1 cm fluid filled cyst (shown below)  Physical Exam  Genitourinary:               Vagina: Moist, pink, discharge normal  well rugated, no lesions  Cervix:smooth, pink, no visible lesions, on menses, blood noted  Uterus: Normal size, non-tender, mobile  Ovaries: No masses palpated  Rectal exam: deferred    ASSESSMENT/PLAN:    ICD-10-CM    1. Encounter for annual routine gynecological examination  Z01.419       2. Screening for cervical cancer  Z12.4 Pap screen reflex to HPV if ASCUS - recommend age 25 - 29      3. Screening for gonorrhea  Z11.3 NEISSERIA GONORRHOEA PCR      4. Encounter for screening examination for chlamydial infection  Z11.8 CHLAMYDIA TRACHOMATIS PCR      5. Routine screening for STI (sexually transmitted infection)  Z11.3 NEISSERIA GONORRHOEA PCR     CHLAMYDIA TRACHOMATIS PCR     HIV Antigen Antibody Combo     Treponema Abs w Reflex to RPR and Titer     Hepatitis C antibody     Hepatitis B Surface Antibody     HIV Antigen Antibody Combo     Treponema Abs w Reflex to RPR and Titer     Hepatitis C antibody     Hepatitis B Surface Antibody      6. Vaginal discharge  N89.8 Yeast culture     Bacterial Vaginosis Smear        Results for orders placed or performed in visit on 05/01/23   Bacterial Vaginosis Smear     Status: None (In " process)    Specimen: Vagina; Swab    Narrative    The following orders were created for panel order Bacterial Vaginosis Smear.  Procedure                               Abnormality         Status                     ---------                               -----------         ------                     Bacterial Vaginosis Stain[[id number]]                        In process                   Please view results for these tests on the individual orders.         COUNSELING:   Reviewed preventive health counseling, as reflected in patient instructions   reports that she quit smoking about 2 years ago. Her smoking use included vaping device.     STI testing, pap, and BV/yeast cultures done today.    Discussed using warm compresses on cyst. Recommended following up with GYN physician. They should be able to drain it. She states she definitely wants that.     Beth Sanchez, DNP, APRN, CNM

## 2023-05-01 ENCOUNTER — OFFICE VISIT (OUTPATIENT)
Dept: MIDWIFE SERVICES | Facility: CLINIC | Age: 28
End: 2023-05-01
Payer: COMMERCIAL

## 2023-05-01 VITALS
HEIGHT: 68 IN | SYSTOLIC BLOOD PRESSURE: 110 MMHG | BODY MASS INDEX: 20.13 KG/M2 | WEIGHT: 132.8 LBS | DIASTOLIC BLOOD PRESSURE: 90 MMHG

## 2023-05-01 DIAGNOSIS — Z12.4 SCREENING FOR CERVICAL CANCER: ICD-10-CM

## 2023-05-01 DIAGNOSIS — Z11.8 ENCOUNTER FOR SCREENING EXAMINATION FOR CHLAMYDIAL INFECTION: ICD-10-CM

## 2023-05-01 DIAGNOSIS — Z01.419 ENCOUNTER FOR ANNUAL ROUTINE GYNECOLOGICAL EXAMINATION: Primary | ICD-10-CM

## 2023-05-01 DIAGNOSIS — Z11.51 SCREENING FOR HUMAN PAPILLOMAVIRUS (HPV): ICD-10-CM

## 2023-05-01 DIAGNOSIS — Z11.3 SCREENING FOR GONORRHEA: ICD-10-CM

## 2023-05-01 DIAGNOSIS — N89.8 VAGINAL DISCHARGE: ICD-10-CM

## 2023-05-01 DIAGNOSIS — Z11.3 ROUTINE SCREENING FOR STI (SEXUALLY TRANSMITTED INFECTION): ICD-10-CM

## 2023-05-01 PROBLEM — Z87.59 HISTORY OF POSTPARTUM DEPRESSION: Status: RESOLVED | Noted: 2021-04-05 | Resolved: 2023-05-01

## 2023-05-01 PROBLEM — O99.013 ANEMIA AFFECTING PREGNANCY IN THIRD TRIMESTER: Status: RESOLVED | Noted: 2021-08-27 | Resolved: 2023-05-01

## 2023-05-01 PROBLEM — O09.90 PREGNANCY, SUPERVISION, HIGH-RISK: Status: RESOLVED | Noted: 2021-04-02 | Resolved: 2023-05-01

## 2023-05-01 PROBLEM — Z86.59 HISTORY OF POSTPARTUM DEPRESSION: Status: RESOLVED | Noted: 2021-04-05 | Resolved: 2023-05-01

## 2023-05-01 LAB
NUGENT SCORE: 2
WHITE BLOOD CELLS: NORMAL

## 2023-05-01 PROCEDURE — 87205 SMEAR GRAM STAIN: CPT | Performed by: ADVANCED PRACTICE MIDWIFE

## 2023-05-01 PROCEDURE — 36415 COLL VENOUS BLD VENIPUNCTURE: CPT | Performed by: ADVANCED PRACTICE MIDWIFE

## 2023-05-01 PROCEDURE — 86780 TREPONEMA PALLIDUM: CPT | Performed by: ADVANCED PRACTICE MIDWIFE

## 2023-05-01 PROCEDURE — 87389 HIV-1 AG W/HIV-1&-2 AB AG IA: CPT | Performed by: ADVANCED PRACTICE MIDWIFE

## 2023-05-01 PROCEDURE — 86803 HEPATITIS C AB TEST: CPT | Performed by: ADVANCED PRACTICE MIDWIFE

## 2023-05-01 PROCEDURE — 87591 N.GONORRHOEAE DNA AMP PROB: CPT | Performed by: ADVANCED PRACTICE MIDWIFE

## 2023-05-01 PROCEDURE — 87102 FUNGUS ISOLATION CULTURE: CPT | Performed by: ADVANCED PRACTICE MIDWIFE

## 2023-05-01 PROCEDURE — 99213 OFFICE O/P EST LOW 20 MIN: CPT | Mod: 25 | Performed by: ADVANCED PRACTICE MIDWIFE

## 2023-05-01 PROCEDURE — 86706 HEP B SURFACE ANTIBODY: CPT | Performed by: ADVANCED PRACTICE MIDWIFE

## 2023-05-01 PROCEDURE — 99395 PREV VISIT EST AGE 18-39: CPT | Performed by: ADVANCED PRACTICE MIDWIFE

## 2023-05-01 PROCEDURE — 87624 HPV HI-RISK TYP POOLED RSLT: CPT | Performed by: ADVANCED PRACTICE MIDWIFE

## 2023-05-01 PROCEDURE — 87491 CHLMYD TRACH DNA AMP PROBE: CPT | Performed by: ADVANCED PRACTICE MIDWIFE

## 2023-05-01 PROCEDURE — G0145 SCR C/V CYTO,THINLAYER,RESCR: HCPCS | Performed by: ADVANCED PRACTICE MIDWIFE

## 2023-05-01 ASSESSMENT — ANXIETY QUESTIONNAIRES
GAD7 TOTAL SCORE: 8
GAD7 TOTAL SCORE: 8
5. BEING SO RESTLESS THAT IT IS HARD TO SIT STILL: SEVERAL DAYS
7. FEELING AFRAID AS IF SOMETHING AWFUL MIGHT HAPPEN: SEVERAL DAYS
IF YOU CHECKED OFF ANY PROBLEMS ON THIS QUESTIONNAIRE, HOW DIFFICULT HAVE THESE PROBLEMS MADE IT FOR YOU TO DO YOUR WORK, TAKE CARE OF THINGS AT HOME, OR GET ALONG WITH OTHER PEOPLE: SOMEWHAT DIFFICULT
1. FEELING NERVOUS, ANXIOUS, OR ON EDGE: MORE THAN HALF THE DAYS
2. NOT BEING ABLE TO STOP OR CONTROL WORRYING: SEVERAL DAYS
6. BECOMING EASILY ANNOYED OR IRRITABLE: SEVERAL DAYS
3. WORRYING TOO MUCH ABOUT DIFFERENT THINGS: SEVERAL DAYS

## 2023-05-01 ASSESSMENT — PATIENT HEALTH QUESTIONNAIRE - PHQ9
SUM OF ALL RESPONSES TO PHQ QUESTIONS 1-9: 0
5. POOR APPETITE OR OVEREATING: SEVERAL DAYS

## 2023-05-02 ENCOUNTER — TELEPHONE (OUTPATIENT)
Dept: MIDWIFE SERVICES | Facility: CLINIC | Age: 28
End: 2023-05-02
Payer: COMMERCIAL

## 2023-05-02 LAB
C TRACH DNA SPEC QL NAA+PROBE: NEGATIVE
HBV SURFACE AB SERPL IA-ACNC: 12.03 M[IU]/ML
HBV SURFACE AB SERPL IA-ACNC: REACTIVE M[IU]/ML
HCV AB SERPL QL IA: NONREACTIVE
HIV 1+2 AB+HIV1 P24 AG SERPL QL IA: NONREACTIVE
N GONORRHOEA DNA SPEC QL NAA+PROBE: NEGATIVE
T PALLIDUM AB SER QL: NONREACTIVE

## 2023-05-02 NOTE — TELEPHONE ENCOUNTER
Pt is having severe anxiety while awaiting lab results.  Trep and BV testing results available and WNL  Reiterated that other labs lakia still in process and she will see the results as soon as the clinic is able to view them.  Provider will reach out with interpretation once all of the results are back.  Pt verbalized understanding, in agreement with plan, and voiced no further questions.  Aung Waite RN on 5/2/2023 at 1:29 PM

## 2023-05-04 LAB
BKR LAB AP GYN ADEQUACY: NORMAL
BKR LAB AP GYN INTERPRETATION: NORMAL
BKR LAB AP HPV REFLEX: NORMAL
BKR LAB AP LMP: NORMAL
BKR LAB AP PREVIOUS ABNORMAL: NORMAL
PATH REPORT.COMMENTS IMP SPEC: NORMAL
PATH REPORT.COMMENTS IMP SPEC: NORMAL
PATH REPORT.RELEVANT HX SPEC: NORMAL

## 2023-05-05 LAB — BACTERIA SPEC CULT: NO GROWTH

## 2023-05-11 LAB
HUMAN PAPILLOMA VIRUS 16 DNA: NEGATIVE
HUMAN PAPILLOMA VIRUS 18 DNA: NEGATIVE
HUMAN PAPILLOMA VIRUS FINAL DIAGNOSIS: ABNORMAL
HUMAN PAPILLOMA VIRUS OTHER HR: POSITIVE

## 2023-05-12 ENCOUNTER — PATIENT OUTREACH (OUTPATIENT)
Dept: OBGYN | Facility: CLINIC | Age: 28
End: 2023-05-12
Payer: COMMERCIAL

## 2023-05-12 DIAGNOSIS — R87.810 ASCUS WITH POSITIVE HIGH RISK HPV CERVICAL: ICD-10-CM

## 2023-05-12 DIAGNOSIS — R87.610 ASCUS WITH POSITIVE HIGH RISK HPV CERVICAL: ICD-10-CM

## 2023-09-25 ENCOUNTER — OFFICE VISIT (OUTPATIENT)
Dept: OBGYN | Facility: CLINIC | Age: 28
End: 2023-09-25
Payer: COMMERCIAL

## 2023-09-25 ENCOUNTER — TELEPHONE (OUTPATIENT)
Dept: OBGYN | Facility: CLINIC | Age: 28
End: 2023-09-25

## 2023-09-25 VITALS
SYSTOLIC BLOOD PRESSURE: 112 MMHG | WEIGHT: 133 LBS | DIASTOLIC BLOOD PRESSURE: 64 MMHG | BODY MASS INDEX: 20.16 KG/M2 | HEIGHT: 68 IN

## 2023-09-25 DIAGNOSIS — Z23 NEED FOR PROPHYLACTIC VACCINATION AND INOCULATION AGAINST INFLUENZA: ICD-10-CM

## 2023-09-25 DIAGNOSIS — N90.7 LABIAL CYST: Primary | ICD-10-CM

## 2023-09-25 PROCEDURE — 90686 IIV4 VACC NO PRSV 0.5 ML IM: CPT | Performed by: STUDENT IN AN ORGANIZED HEALTH CARE EDUCATION/TRAINING PROGRAM

## 2023-09-25 PROCEDURE — 90471 IMMUNIZATION ADMIN: CPT | Performed by: STUDENT IN AN ORGANIZED HEALTH CARE EDUCATION/TRAINING PROGRAM

## 2023-09-25 PROCEDURE — 99213 OFFICE O/P EST LOW 20 MIN: CPT | Mod: 25 | Performed by: STUDENT IN AN ORGANIZED HEALTH CARE EDUCATION/TRAINING PROGRAM

## 2023-09-25 NOTE — Clinical Note
Please schedule IN OFFICE PROCEDURE for:  Patient Name:  Laurence Torres ([id number]). :  1995   Requested Dates:  Thursday Schedule based on:  Availability  Amount of time needed for the procedure:  30 minutes  Amount of time prior to procedure patient to arrive: None Expected time off from work:  1 day Surgeon:  Brandi Shah MD Procedure permit to read:  removal of bilateral labial cysts Location for surgery to performed:   procedure room Anesthesia:  Regional block   DIAGNOSIS:  Labial cysts  Special instructions:  None Vendor Rep:  None Meds Needed: None RXs explained to patient: Not Applicable RXs sent to pharmacy: Not Applicable RXs given to patient: Not Applicable RXs to be mailed to patient: Not Applicable  POST OP TO BE DONE Not needed

## 2023-09-25 NOTE — PROGRESS NOTES
"[facility]  OB/GYN Clinic Note    SUBJECTIVE:                                                   Laurence Torres is a 28 year old female who presents to clinic today for the following health issue(s):  Patient presents with:  Vaginal Problem  Imm/Inj: Flu Shot    HPI:  Patient presents to follow-up on labial cysts. Present for several years. Describes them as being near the clitoris. Remained stable. First noticed it several years ago. Denies pain. Doesn't drain. Not bothered by it except for appearance. Sometimes gets bumps in different areas that result in groin lymph node swelling. Denies that symptom as present. Previously told that removal would be considered cosmetic.     Patient's last menstrual period was 2023..   Patient is not sexually active, .  Using nothing for contraception.    reports that she quit smoking about 2 years ago. Her smoking use included vaping device. She has never used smokeless tobacco.  STD testing offered?   Recently tested    Health maintenance reviewed: accepts flu vaccine        OBJECTIVE:     /64   Ht 1.727 m (5' 8\")   Wt 60.3 kg (133 lb)   LMP 2023   BMI 20.22 kg/m    Body mass index is 20.22 kg/m .    Exam:  Constitutional:  Appearance: Well nourished, well developed alert, in no acute distress  : externa genitalia notable for left sided fluid filled cyst on labia minora, measuring 0.5cm, and smaller right labia minora cyst.     ASSESSMENT/PLAN:                                                        ICD-10-CM    1. Labial cyst  N90.7       2. Need for prophylactic vaccination and inoculation against influenza  Z23 INFLUENZA VACCINE IM > 6 MONTHS VALENT IIV4 (AFLURIA/FLUZONE)        Laurence Torres is a 28 year old  with small bilateral labial cysts.   Discussed option of draining in clinic today vs removal, vs observation. Has history of recurrent cyst and prefers removal. Recommend procedure for removal, due to " need for cautery and local anesthesia. Reviewed risk of recurrence. Discussed that if lymph node swelling occurs, likely this is an infectious cyst and drainage would be recommended.   Message sent for scheduling labial cyst removal in procedure room.     Brandi Shah MD, MHS  [facility] [place]  09/25/23

## 2023-09-25 NOTE — TELEPHONE ENCOUNTER
Please schedule IN OFFICE PROCEDURE for:     Patient Name:  Laurence Torres ([id number]).   :  1995       Requested Dates:  Thursday   Schedule based on:  Availability   Amount of time needed for the procedure:  30 minutes   Amount of time prior to procedure patient to arrive: None   Expected time off from work:  1 day   Surgeon:  rBandi Shah MD   Procedure permit to read:  removal of bilateral labial cysts   Location for surgery to performed:   procedure room   Anesthesia:  Regional block       DIAGNOSIS:  Labial cysts     Special instructions:  None   Vendor Rep:  None   Meds Needed: None   RXs explained to patient: Not Applicable   RXs sent to pharmacy: Not Applicable   RXs given to patient: Not Applicable   RXs to be mailed to patient: Not Applicable     POST OP TO BE DONE Not needed

## 2023-10-12 ENCOUNTER — OFFICE VISIT (OUTPATIENT)
Dept: OBGYN | Facility: CLINIC | Age: 28
End: 2023-10-12
Payer: COMMERCIAL

## 2023-10-12 DIAGNOSIS — N90.7 LABIAL CYST: Primary | ICD-10-CM

## 2023-10-12 PROCEDURE — 11420 EXC H-F-NK-SP B9+MARG 0.5/<: CPT | Performed by: STUDENT IN AN ORGANIZED HEALTH CARE EDUCATION/TRAINING PROGRAM

## 2023-10-12 PROCEDURE — 88305 TISSUE EXAM BY PATHOLOGIST: CPT | Performed by: STUDENT IN AN ORGANIZED HEALTH CARE EDUCATION/TRAINING PROGRAM

## 2023-10-12 NOTE — PROGRESS NOTES
[facility]  OB/GYN Clinic Note    INDICATIONS:                                                    Laurence Torres is a 28 year old  female that was found to have bilateral vulvar lesions. She is bothered by the appearance of the lesions.     Is a pregnancy test required: No.  Was a consent obtained?  Yes      PROCEDURE:                                                      The area was prepped with Betadine. The left fluid filled labial cyst measuring 0.5cm was was injected with 1 cc's of 2% Lidocaine with epinephrine. The right sided cyst was slightly smaller and flat, and was injcted with 1cc of 2% lidocaine with epinephrine. After adequate anesthesia was reached, the skin was grasped with a forcpes and the lesion was excised off of its base with a scissors. Milky white fluid extrude. The cyst was sent to pathology. The base of the cyst was wide and therefore required reapproximation with two interrupted 4-0 Vicryl stitches. Attention was turned to right cyst, which similarly was grasped and excised off of the underlying base. Mucinous yellow tissue extruded. The cyst tissue was sent to pathology.   Pressure was applied to the site to control bleedingExcellent hemostasis was noted.     POST PROCEDURE:                                                      She was observed.  She tolerated the procedure well. There were no complications. Patient was discharged in stable condition.    Keep area clean, no soaking, no douching, tampons or intercourse.  Call if bleeding, swelling, pain, redness or fever occur.  Patient will be notified of pathology results.    Brandi Shah MD, S  10/12/23

## 2023-10-16 LAB
PATH REPORT.COMMENTS IMP SPEC: NORMAL
PATH REPORT.COMMENTS IMP SPEC: NORMAL
PATH REPORT.FINAL DX SPEC: NORMAL
PATH REPORT.GROSS SPEC: NORMAL
PATH REPORT.MICROSCOPIC SPEC OTHER STN: NORMAL
PATH REPORT.RELEVANT HX SPEC: NORMAL
PHOTO IMAGE: NORMAL

## 2024-04-17 ENCOUNTER — PATIENT OUTREACH (OUTPATIENT)
Dept: OBGYN | Facility: CLINIC | Age: 29
End: 2024-04-17
Payer: MEDICAID

## 2024-04-17 PROBLEM — R87.610 ASCUS WITH POSITIVE HIGH RISK HPV CERVICAL: Status: ACTIVE | Noted: 2018-02-13

## 2024-04-17 PROBLEM — R87.810 ASCUS WITH POSITIVE HIGH RISK HPV CERVICAL: Status: ACTIVE | Noted: 2018-02-13

## 2024-04-18 ENCOUNTER — OFFICE VISIT (OUTPATIENT)
Dept: FAMILY MEDICINE | Facility: CLINIC | Age: 29
End: 2024-04-18
Payer: COMMERCIAL

## 2024-04-18 VITALS
DIASTOLIC BLOOD PRESSURE: 79 MMHG | TEMPERATURE: 98.1 F | SYSTOLIC BLOOD PRESSURE: 132 MMHG | RESPIRATION RATE: 18 BRPM | OXYGEN SATURATION: 100 % | HEART RATE: 77 BPM | HEIGHT: 68 IN | WEIGHT: 132.6 LBS | BODY MASS INDEX: 20.1 KG/M2

## 2024-04-18 DIAGNOSIS — Z00.00 ROUTINE GENERAL MEDICAL EXAMINATION AT A HEALTH CARE FACILITY: Primary | ICD-10-CM

## 2024-04-18 DIAGNOSIS — Z12.4 CERVICAL CANCER SCREENING: ICD-10-CM

## 2024-04-18 DIAGNOSIS — Z13.1 SCREENING FOR DIABETES MELLITUS: ICD-10-CM

## 2024-04-18 DIAGNOSIS — J45.20 MILD INTERMITTENT ASTHMA WITHOUT COMPLICATION: ICD-10-CM

## 2024-04-18 DIAGNOSIS — R22.9 LOCALIZED SUPERFICIAL SWELLING, MASS, OR LUMP: ICD-10-CM

## 2024-04-18 DIAGNOSIS — F32.5 MAJOR DEPRESSIVE DISORDER, SINGLE EPISODE, IN REMISSION (H): ICD-10-CM

## 2024-04-18 LAB — HGB BLD-MCNC: 12.5 G/DL (ref 11.7–15.7)

## 2024-04-18 PROCEDURE — 90471 IMMUNIZATION ADMIN: CPT | Performed by: FAMILY MEDICINE

## 2024-04-18 PROCEDURE — 90677 PCV20 VACCINE IM: CPT | Performed by: FAMILY MEDICINE

## 2024-04-18 PROCEDURE — G0145 SCR C/V CYTO,THINLAYER,RESCR: HCPCS | Performed by: FAMILY MEDICINE

## 2024-04-18 PROCEDURE — 87624 HPV HI-RISK TYP POOLED RSLT: CPT | Performed by: FAMILY MEDICINE

## 2024-04-18 PROCEDURE — 36415 COLL VENOUS BLD VENIPUNCTURE: CPT | Performed by: FAMILY MEDICINE

## 2024-04-18 PROCEDURE — 99214 OFFICE O/P EST MOD 30 MIN: CPT | Mod: 25 | Performed by: FAMILY MEDICINE

## 2024-04-18 PROCEDURE — 96127 BRIEF EMOTIONAL/BEHAV ASSMT: CPT | Performed by: FAMILY MEDICINE

## 2024-04-18 PROCEDURE — G0124 SCREEN C/V THIN LAYER BY MD: HCPCS | Performed by: PATHOLOGY

## 2024-04-18 PROCEDURE — 85018 HEMOGLOBIN: CPT | Performed by: FAMILY MEDICINE

## 2024-04-18 PROCEDURE — 99385 PREV VISIT NEW AGE 18-39: CPT | Mod: 25 | Performed by: FAMILY MEDICINE

## 2024-04-18 PROCEDURE — 80048 BASIC METABOLIC PNL TOTAL CA: CPT | Performed by: FAMILY MEDICINE

## 2024-04-18 RX ORDER — ALBUTEROL SULFATE 90 UG/1
2 AEROSOL, METERED RESPIRATORY (INHALATION) EVERY 6 HOURS PRN
Qty: 18 G | Refills: 3 | Status: SHIPPED | OUTPATIENT
Start: 2024-04-18

## 2024-04-18 SDOH — HEALTH STABILITY: PHYSICAL HEALTH: ON AVERAGE, HOW MANY DAYS PER WEEK DO YOU ENGAGE IN MODERATE TO STRENUOUS EXERCISE (LIKE A BRISK WALK)?: 0 DAYS

## 2024-04-18 ASSESSMENT — ASTHMA QUESTIONNAIRES
QUESTION_5 LAST FOUR WEEKS HOW WOULD YOU RATE YOUR ASTHMA CONTROL: SOMEWHAT CONTROLLED
ACT_TOTALSCORE: 16
QUESTION_1 LAST FOUR WEEKS HOW MUCH OF THE TIME DID YOUR ASTHMA KEEP YOU FROM GETTING AS MUCH DONE AT WORK, SCHOOL OR AT HOME: SOME OF THE TIME
QUESTION_3 LAST FOUR WEEKS HOW OFTEN DID YOUR ASTHMA SYMPTOMS (WHEEZING, COUGHING, SHORTNESS OF BREATH, CHEST TIGHTNESS OR PAIN) WAKE YOU UP AT NIGHT OR EARLIER THAN USUAL IN THE MORNING: ONCE A WEEK
QUESTION_4 LAST FOUR WEEKS HOW OFTEN HAVE YOU USED YOUR RESCUE INHALER OR NEBULIZER MEDICATION (SUCH AS ALBUTEROL): NOT AT ALL
ACT_TOTALSCORE: 16
QUESTION_2 LAST FOUR WEEKS HOW OFTEN HAVE YOU HAD SHORTNESS OF BREATH: ONCE A DAY

## 2024-04-18 ASSESSMENT — PAIN SCALES - GENERAL: PAINLEVEL: NO PAIN (0)

## 2024-04-18 ASSESSMENT — PATIENT HEALTH QUESTIONNAIRE - PHQ9
SUM OF ALL RESPONSES TO PHQ QUESTIONS 1-9: 4
SUM OF ALL RESPONSES TO PHQ QUESTIONS 1-9: 4
10. IF YOU CHECKED OFF ANY PROBLEMS, HOW DIFFICULT HAVE THESE PROBLEMS MADE IT FOR YOU TO DO YOUR WORK, TAKE CARE OF THINGS AT HOME, OR GET ALONG WITH OTHER PEOPLE: SOMEWHAT DIFFICULT

## 2024-04-18 ASSESSMENT — SOCIAL DETERMINANTS OF HEALTH (SDOH): HOW OFTEN DO YOU GET TOGETHER WITH FRIENDS OR RELATIVES?: NEVER

## 2024-04-18 NOTE — NURSING NOTE
Prior to immunization administration, verified patients identity using patient s name and date of birth. Please see Immunization Activity for additional information.     Screening Questionnaire for Adult Immunization    Are you sick today?   No   Do you have allergies to medications, food, a vaccine component or latex?   No   Have you ever had a serious reaction after receiving a vaccination?   No   Do you have a long-term health problem with heart, lung, kidney, or metabolic disease (e.g., diabetes), asthma, a blood disorder, no spleen, complement component deficiency, a cochlear implant, or a spinal fluid leak?  Are you on long-term aspirin therapy?   Yes   Do you have cancer, leukemia, HIV/AIDS, or any other immune system problem?   No   Do you have a parent, brother, or sister with an immune system problem?   No   In the past 3 months, have you taken medications that affect  your immune system, such as prednisone, other steroids, or anticancer drugs; drugs for the treatment of rheumatoid arthritis, Crohn s disease, or psoriasis; or have you had radiation treatments?   No   Have you had a seizure, or a brain or other nervous system problem?   No   During the past year, have you received a transfusion of blood or blood    products, or been given immune (gamma) globulin or antiviral drug?   No   For women: Are you pregnant or is there a chance you could become       pregnant during the next month?   No   Have you received any vaccinations in the past 4 weeks?   No     Immunization questionnaire was positive for at least one answer.  Notified PROVIDER AWARE.      Patient instructed to remain in clinic for 15 minutes afterwards, and to report any adverse reactions.     Screening performed by Mark Alatorre MA on 4/18/2024 at 1:34 PM.

## 2024-04-18 NOTE — PROGRESS NOTES
Preventive Care Visit  [facility]  Delmyabebacarmina Castellanos MD, Family Medicine  Apr 18, 2024      Assessment & Plan     Routine general medical examination at a health care facility    - Hemoglobin; Future  - Basic metabolic panel  (Ca, Cl, CO2, Creat, Gluc, K, Na, BUN); Future    Mild intermittent asthma without complication  -Used to be on albuterol as needed in the past.  -Prescribed albuterol prn, follow-up in the clinic to reassess in 2 to 3 months.    - albuterol (PROAIR HFA/PROVENTIL HFA/VENTOLIN HFA) 108 (90 Base) MCG/ACT inhaler; Inhale 2 puffs into the lungs every 6 hours as needed for shortness of breath, wheezing or cough    Localized superficial swelling, mass, or lump  -Localized cystic swelling noted in left armpit.  Laurence stated that it has been present for almost a year.  -Nontender, no discharge noted.      PLAN:  -Due to the location and duration , plan for ultrasound.  -Laurence prefers to get them removed.  Will send surgery referral after reviewing ultrasound results.    - US Soft Tissue Chest Wall or Upper Back; Future    Major depressive disorder, single episode, in remission (H24)  -Stable as per Laurence.  -She denied therapy or medications.  Recommend to let me know if she wants.    Cervical cancer screening  -History of high risk HPV positive in 2023.  -Pap done today.    - Pap Screen reflex to HPV if ASCUS - recommend age 25 - 29    Screening for diabetes mellitus  -BMP ordered    Patient has been advised of split billing requirements and indicates understanding: Yes    30 minutes spent by me on the date of the encounter doing chart review, patient visit, and documentation       Counseling  Appropriate preventive services were discussed with this patient, including applicable screening as appropriate for fall prevention, nutrition, physical activity, Tobacco-use cessation, weight loss and cognition.  Checklist reviewing preventive services available  has been given to the patient.  Reviewed patient's diet, addressing concerns and/or questions.   Patient is at risk for social isolation and has been provided with information about the benefit of social connection.           Thelma Dang is a 29 year old, presenting for the following:  Physical        4/18/2024    12:42 PM   Additional Questions   Roomed by [facility] Care Directive  Patient does not have a Health Care Directive or Living Will:     HPI              4/18/2024   General Health   How would you rate your overall physical health? (!) DECLINE   Feel stress (tense, anxious, or unable to sleep) To some extent   (!) STRESS CONCERN      4/18/2024   Nutrition   Three or more servings of calcium each day? (!) NO   Diet: Regular (no restrictions)   How many servings of fruit and vegetables per day? (!) 0-1   How many sweetened beverages each day? 0-1         4/18/2024   Exercise   Days per week of moderate/strenous exercise 0 days   (!) EXERCISE CONCERN      4/18/2024   Social Factors   Frequency of gathering with friends or relatives Never   Worry food won't last until get money to buy more No   Food not last or not have enough money for food? No   Do you have housing?  Yes   Are you worried about losing your housing? No   Lack of transportation? No   Unable to get utilities (heat,electricity)? No   (!) SOCIAL CONNECTIONS CONCERN      4/18/2024   Dental   Dentist two times every year? Yes         4/18/2024   TB Screening   Were you born outside of the [place]? No       Today's PHQ-9 Score:       4/18/2024    12:42 PM   PHQ-9 SCORE   PHQ-9 Total Score MyChart 4 (Minimal depression)   PHQ-9 Total Score 4         4/18/2024   Substance Use   Alcohol more than 3/day or more than 7/wk No   Do you use any other substances recreationally? No     Social History     Tobacco Use    Smoking status: Former     Types: Vaping Device     Quit date: 12/31/2020     Years since quitting: 3.2    Smokeless tobacco:  "Never   Substance Use Topics    Alcohol use: Never    Drug use: Never          Mammogram Screening - Patient under 40 years of age: Routine Mammogram Screening not recommended.         4/18/2024   STI Screening   New sexual partner(s) since last STI/HIV test? No     History of abnormal Pap smear: Last 3 Pap and HPV Results:       Latest Ref Rng & Units 5/1/2023     9:00 AM 2/3/2020    12:00 AM   PAP / HPV   PAP  Negative for Intraepithelial Lesion or Malignancy (NILM)     HPV 16 DNA Negative Negative     HPV 18 DNA Negative Negative     Other HR HPV Negative Positive     PAP-ABSTRACT   See Scanned Document           This result is from an external source.           Latest Ref Rng & Units 5/1/2023     9:00 AM 2/3/2020    12:00 AM   PAP / HPV   PAP  Negative for Intraepithelial Lesion or Malignancy (NILM)     HPV 16 DNA Negative Negative     HPV 18 DNA Negative Negative     Other HR HPV Negative Positive     PAP-ABSTRACT   See Scanned Document           This result is from an external source.           4/18/2024   Contraception/Family Planning   Questions about contraception or family planning No        Reviewed and updated as needed this visit by Provider                          Review of Systems  Constitutional, HEENT, cardiovascular, pulmonary, gi and gu systems are negative, except as otherwise noted.     Objective    Exam  There were no vitals taken for this visit.   Estimated body mass index is 20.22 kg/m  as calculated from the following:    Height as of 9/25/23: 1.727 m (5' 8\").    Weight as of 9/25/23: 60.3 kg (133 lb).    Physical Exam  GENERAL: alert and no distress  NECK: no adenopathy, no asymmetry, masses, or scars  RESP: lungs clear to auscultation - no rales, rhonchi or wheezes  CV: regular rate and rhythm, normal S1 S2, no S3 or S4, no murmur, click or rub, no peripheral edema  ABDOMEN: soft, nontender, no hepatosplenomegaly, no masses and bowel sounds normal  MS: no gross musculoskeletal defects " noted, no edema        Signed Electronically by: Bijal Castellanos MD    Answers submitted by the patient for this visit:  Patient Health Questionnaire (Submitted on 4/18/2024)  If you checked off any problems, how difficult have these problems made it for you to do your work, take care of things at home, or get along with other people?: Somewhat difficult  PHQ9 TOTAL SCORE: 4

## 2024-04-18 NOTE — COMMUNITY RESOURCES LIST (ENGLISH)
April 18, 2024           YOUR PERSONALIZED LIST OF SERVICES & PROGRAMS           & RECREATION    Sports      of [place] - Sports clubs and recreational activities - [facility]  [address] [place] [address] (Distance: 4.2 miles)  Language: English  Fee: Self pay, Sliding scale      Association - Offered Programs  [address] [place] [address] (Distance: 2.4 miles)  Website: https://[email or web address].org/programs  Language: English  Fee: Free, Self pay  Accessibility: Translation services      [facility] - Adult Enrichment  Phone: ([phone]) [phone]  Website: https://Buzzilla/adults-seniors/adult-enrichment/  Language: English  Hours: Mon 7:30 AM - 4:00 PM Tue 7:30 AM - 4:00 PM Wed 7:30 AM - 4:00 PM Thu 7:30 AM - 4:00 PM Fri 7:30 AM - 4:00 PM    Classes/Groups      Park & Recreation Board - Gym or workout facility - [place] [employer] [facility]  [address] [place] [address] (Distance: 14.6 miles)  Phone: [phone]) [phone]  Language: English, Senegalese  Fee: Free, Self pay  Accessibility: Translation services      Park & SearchForceation Board - Gym or workout facility - [facility]  [address] [place] [address] (Distance: 13.1 miles)  Language: English  Fee: Free, Self pay  Accessibility: [facility] - Care Coordination (Healthcare only)  Phone: [phone]) [phone]  Website: [email or web address]  Language: English, Senegalese  Hours: Wed 9:00 AM - 11:30 AM Thu 1:00 PM - 4:00 PM, 5:30 PM - 7:00 PM               IMPORTANT NUMBERS & WEBSITES        Emergency Services  911  .   United Way  211 http://211unitedway.org  .   Poison Control  (630) 116-6332 http://[place]poison.org http://wis[place]poison.org  .     Suicide and Crisis Lifeline  988 http://988lifeline.org  .   Childhelp National Child Abuse  Hotline  979.505.8729 http://Childhelphotline.org   .   National Sexual Assault Hotline  (135) 968-8240 (HOPE) http://Rainn.org   .     National Runaway Safeline  (799) 733-1155 (RUNAWAY) http://Loop App.[employer]  .   Pregnancy & Postpartum Support  Call/text 120-603-4775  [place]: http://ppsupportmn.org  [place]: http://psichapters.com/wi  .   Substance Abuse National Helpline ([facility])  876-786-HELP (0426) http://Findtreatment.gov   .                DISCLAIMER: These resources have been generated via the [employer] Platform. [employer] does not endorse any service providers mentioned in this resource list. [employer] does not guarantee that the services mentioned in this resource list will be available to you or will improve your health or wellness.    [facility]

## 2024-04-19 LAB
ANION GAP SERPL CALCULATED.3IONS-SCNC: 10 MMOL/L (ref 7–15)
BUN SERPL-MCNC: 8.1 MG/DL (ref 6–20)
CALCIUM SERPL-MCNC: 9.6 MG/DL (ref 8.6–10)
CHLORIDE SERPL-SCNC: 102 MMOL/L (ref 98–107)
CREAT SERPL-MCNC: 0.77 MG/DL (ref 0.51–0.95)
DEPRECATED HCO3 PLAS-SCNC: 24 MMOL/L (ref 22–29)
EGFRCR SERPLBLD CKD-EPI 2021: >90 ML/MIN/1.73M2
GLUCOSE SERPL-MCNC: 84 MG/DL (ref 70–99)
POTASSIUM SERPL-SCNC: 4.4 MMOL/L (ref 3.4–5.3)
SODIUM SERPL-SCNC: 136 MMOL/L (ref 135–145)

## 2024-04-23 LAB
BKR LAB AP GYN ADEQUACY: ABNORMAL
BKR LAB AP GYN INTERPRETATION: ABNORMAL
BKR LAB AP HPV REFLEX: ABNORMAL
BKR LAB AP PREVIOUS ABNL DX: ABNORMAL
BKR LAB AP PREVIOUS ABNORMAL: ABNORMAL
PATH REPORT.COMMENTS IMP SPEC: ABNORMAL
PATH REPORT.COMMENTS IMP SPEC: ABNORMAL
PATH REPORT.RELEVANT HX SPEC: ABNORMAL

## 2024-04-24 ENCOUNTER — PATIENT OUTREACH (OUTPATIENT)
Dept: OBGYN | Facility: CLINIC | Age: 29
End: 2024-04-24

## 2024-04-30 DIAGNOSIS — Z01.812 PRE-PROCEDURE LAB EXAM: Primary | ICD-10-CM

## 2024-05-02 ENCOUNTER — ANCILLARY PROCEDURE (OUTPATIENT)
Dept: ULTRASOUND IMAGING | Facility: CLINIC | Age: 29
End: 2024-05-02
Attending: FAMILY MEDICINE
Payer: MEDICAID

## 2024-05-02 DIAGNOSIS — R22.9 LOCALIZED SUPERFICIAL SWELLING, MASS, OR LUMP: ICD-10-CM

## 2024-05-02 PROCEDURE — 76882 US LMTD JT/FCL EVL NVASC XTR: CPT | Mod: LT

## 2024-05-03 DIAGNOSIS — L72.3 SEBACEOUS CYST OF LEFT AXILLA: Primary | ICD-10-CM

## 2024-05-29 ENCOUNTER — OFFICE VISIT (OUTPATIENT)
Dept: SURGERY | Facility: CLINIC | Age: 29
End: 2024-05-29
Payer: COMMERCIAL

## 2024-05-29 VITALS — HEART RATE: 73 BPM | SYSTOLIC BLOOD PRESSURE: 131 MMHG | DIASTOLIC BLOOD PRESSURE: 88 MMHG

## 2024-05-29 DIAGNOSIS — L72.3 SEBACEOUS CYST OF LEFT AXILLA: ICD-10-CM

## 2024-05-29 PROCEDURE — 99203 OFFICE O/P NEW LOW 30 MIN: CPT | Performed by: SURGERY

## 2024-05-29 NOTE — PROGRESS NOTES
Patient seen in consultation for left axillary cyst by Bijal Stern MD    HPI:  Patient is a 29 year old female  with complaints of left axillary lump  Had US done consistent with cyst  The patient noticed the symptoms about 1 year ago.    Not painful now but was when it was larger. No drainage  Got a bit smaller since the US maybe half that size  Has had some labial cysts in past  nothing makes the episode better.  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)    Postpartum depression     Urinary tract infection        Past Surgical History:   Procedure Laterality Date    EXTRACTION(S) DENTAL      [place]       Social History     Socioeconomic History    Marital status: Single     Spouse name: Not on file    Number of children: Not on file    Years of education: Not on file    Highest education level: Not on file   Occupational History    Not on file   Tobacco Use    Smoking status: Former     Types: Vaping Device     Quit date: 12/31/2020     Years since quitting: 3.4    Smokeless tobacco: Never   Substance and Sexual Activity    Alcohol use: Never    Drug use: Never    Sexual activity: Not Currently     Partners: Male     Birth control/protection: None   Other Topics Concern    Not on file   Social History Narrative    Not on file     Social Determinants of Health     Financial Resource Strain: Low Risk  (4/18/2024)    Financial Resource Strain     Within the past 12 months,  have you or your family members you live with been unable to get utilities (heat, electricity) when it was really needed?: No   Food Insecurity: Low Risk  (4/18/2024)    Food Insecurity     Within the past 12 months, did you worry that your food would run out before you got money to buy more?: No     Within the past 12 months, did the food you bought just not last and you didn t have money to get more?: No   Transportation Needs: Low Risk  (4/18/2024)    Transportation Needs     Within the past 12 months, has lack of transportation kept you from medical appointments, getting your medicines, non-medical meetings or appointments, work, or from getting things that you need?: No   Physical Activity: Unknown (4/18/2024)    Exercise Vital Sign     Days of Exercise per Week: 0 days     Minutes of Exercise per Session: Not on file   Stress: Stress Concern Present (4/18/2024)    [place] [employer]ational [employer] - Occupational Stress Questionnaire     Feeling of Stress : To some extent   Social Connections: Unknown (4/18/2024)    Social Connection and Isolation Panel [NHANES]     Frequency of Communication with Friends and Family: Not on file     Frequency of Social Gatherings with Friends and Family: Never     Attends Mandaeism Services: Not on file     Active Member of Clubs or Organizations: Not on file     Attends Club or Organization Meetings: Not on file     Marital Status: Not on file   Interpersonal Safety: Low Risk  (4/18/2024)    Interpersonal Safety     Do you feel physically and emotionally safe where you currently live?: Yes     Within the past 12 months, have you been hit, slapped, kicked or otherwise physically hurt by someone?: No     Within the past 12 months, have you been humiliated or emotionally abused in other ways by your partner or ex-partner?: No   Housing Stability: Low Risk  (4/18/2024)    Housing Stability     Do you have housing? : Yes     Are you worried about losing your housing?: No        Current Outpatient Medications   Medication Sig Dispense Refill    albuterol (PROAIR HFA/PROVENTIL HFA/VENTOLIN HFA) 108 (90 Base) MCG/ACT inhaler Inhale 2 puffs into the lungs every 6 hours as needed for shortness of breath, wheezing or cough 18 g 3       Medications and history reviewed    Physical exam:  Vitals: /88   Pulse 73   LMP 04/01/2024   BMI= There is no height or weight on file to calculate BMI.    Constitutional: healthy, alert, and no distress  Head: Normocephalic. No masses, lesions, tenderness or abnormalities  Skin: left anterior axilla with firm rounded mobile lesion approximately 1.5 cm consistent with cyst, no cellulitis or drainage  Psychiatric: mentation appears normal and affect normal/bright      Imaging shows:  EXAMINATION: US AXILLARY LEFT, 5/2/2024 11:30 AM      HISTORY: Enlarging left axillary cystic swelling. History of other  dermal cysts requiring drainage and ultimately excision.      COMPARISON: None.     FINDINGS:     Targeted ultrasound by technologist and radiologist. In the area of  concern left axilla there is a 3.4 x 1.4 x 2.6 cm dermal cyst with  layering and mobile debris without internal vascularity, most  consistent with a benign epidermal/sebaceous cyst. A few  morphologically benign-appearing lymph nodes are noted in the  surrounding area. No suspicious finding.                                                                      IMPRESSION: BI-RADS CATEGORY: 2 - Benign.     RECOMMENDED FOLLOW-UP: Routine yearly mammography beginning at age 40  or as discussed with your provider.     Given the benign imaging findings, further management of left axillary  dermal cyst should be based on clinical grounds.    Assessment:     ICD-10-CM    1. Sebaceous cyst of left axilla  L72.3 Adult General Surg Referral        Plan: Cyst has reduced in size it is no longer painful but she still desires removal.  With this being smaller now should be able to remove this in  clinic under local.  Patient feels she would be able to tolerate this well so we will work on scheduling a time for her to see what works.    Shya Romero MD

## 2024-05-29 NOTE — LETTER
5/29/2024         RE: Laurence Torres  [address]  [facility] [address]        Dear Colleague,    Thank you for referring your patient, Laurence Torres, to the [facility]. Please see a copy of my visit note below.    Patient seen in consultation for left axillary cyst by Bijal Stern MD    HPI:  Patient is a 29 year old female  with complaints of left axillary lump  Had US done consistent with cyst  The patient noticed the symptoms about 1 year ago.    Not painful now but was when it was larger. No drainage  Got a bit smaller since the US maybe half that size  Has had some labial cysts in past  nothing makes the episode better.  Patient has not family history of similar problems    Review Of Systems    Skin: as above  Ears/Nose/Throat: negative  Respiratory: No shortness of breath, dyspnea on exertion, cough, or hemoptysis  Cardiovascular: negative  Gastrointestinal: negative  Genitourinary: negative  Musculoskeletal: negative  Neurologic: negative  Hematologic/Lymphatic/Immunologic: negative  Endocrine: negative      Past Medical History:   Diagnosis Date     Abnormal Pap smear of cervix 02/13/2018     Herpes simplex virus (HSV) infection     HSV2 (Diagnosed 1 year ago - asymptomatic)     Postpartum depression      Urinary tract infection        Past Surgical History:   Procedure Laterality Date     EXTRACTION(S) DENTAL      [place] Teeth       Social History     Socioeconomic History     Marital status: Single     Spouse name: Not on file     Number of children: Not on file     Years of education: Not on file     Highest education level: Not on file   Occupational History     Not on file   Tobacco Use     Smoking status: Former     Types: Vaping Device     Quit date: 12/31/2020     Years since quitting: 3.4     Smokeless tobacco: Never   Substance and Sexual Activity     Alcohol use: Never     Drug use: Never     Sexual activity: Not Currently      Partners: Male     Birth control/protection: None   Other Topics Concern     Not on file   Social History Narrative     Not on file     Social Determinants of Health     Financial Resource Strain: Low Risk  (4/18/2024)    Financial Resource Strain      Within the past 12 months, have you or your family members you live with been unable to get utilities (heat, electricity) when it was really needed?: No   Food Insecurity: Low Risk  (4/18/2024)    Food Insecurity      Within the past 12 months, did you worry that your food would run out before you got money to buy more?: No      Within the past 12 months, did the food you bought just not last and you didn t have money to get more?: No   Transportation Needs: Low Risk  (4/18/2024)    Transportation Needs      Within the past 12 months, has lack of transportation kept you from medical appointments, getting your medicines, non-medical meetings or appointments, work, or from getting things that you need?: No   Physical Activity: Unknown (4/18/2024)    Exercise Vital Sign      Days of Exercise per Week: 0 days      Minutes of Exercise per Session: Not on file   Stress: Stress Concern Present (4/18/2024)    [place] of Occupational Health - Occupational Stress Questionnaire      Feeling of Stress : To some extent   Social Connections: Unknown (4/18/2024)    Social Connection and Isolation Panel [NHANES]      Frequency of Communication with Friends and Family: Not on file      Frequency of Social Gatherings with Friends and Family: Never      Attends Scientologist Services: Not on file      Active Member of Clubs or Organizations: Not on file      Attends Club or Organization Meetings: Not on file      Marital Status: Not on file   Interpersonal Safety: Low Risk  (4/18/2024)    Interpersonal Safety      Do you feel physically and emotionally safe where you currently live?: Yes      Within the past 12 months, have you been hit, slapped, kicked or otherwise physically  hurt by someone?: No      Within the past 12 months, have you been humiliated or emotionally abused in other ways by your partner or ex-partner?: No   Housing Stability: Low Risk  (4/18/2024)    Housing Stability      Do you have housing? : Yes      Are you worried about losing your housing?: No       Current Outpatient Medications   Medication Sig Dispense Refill     albuterol (PROAIR HFA/PROVENTIL HFA/VENTOLIN HFA) 108 (90 Base) MCG/ACT inhaler Inhale 2 puffs into the lungs every 6 hours as needed for shortness of breath, wheezing or cough 18 g 3       Medications and history reviewed    Physical exam:  Vitals: /88   Pulse 73   LMP 04/01/2024   BMI= There is no height or weight on file to calculate BMI.    Constitutional: healthy, alert, and no distress  Head: Normocephalic. No masses, lesions, tenderness or abnormalities  Skin: left anterior axilla with firm rounded mobile lesion approximately 1.5 cm consistent with cyst, no cellulitis or drainage  Psychiatric: mentation appears normal and affect normal/bright      Imaging shows:  EXAMINATION: US AXILLARY LEFT, 5/2/2024 11:30 AM      HISTORY: Enlarging left axillary cystic swelling. History of other  dermal cysts requiring drainage and ultimately excision.      COMPARISON: None.     FINDINGS:     Targeted ultrasound by technologist and radiologist. In the area of  concern left axilla there is a 3.4 x 1.4 x 2.6 cm dermal cyst with  layering and mobile debris without internal vascularity, most  consistent with a benign epidermal/sebaceous cyst. A few  morphologically benign-appearing lymph nodes are noted in the  surrounding area. No suspicious finding.                                                                      IMPRESSION: BI-RADS CATEGORY: 2 - Benign.     RECOMMENDED FOLLOW-UP: Routine yearly mammography beginning at age 40  or as discussed with your provider.     Given the benign imaging findings, further management of left axillary  dermal cyst  should be based on clinical grounds.    Assessment:     ICD-10-CM    1. Sebaceous cyst of left axilla  L72.3 Adult General Surg Referral        Plan: Cyst has reduced in size it is no longer painful but she still desires removal.  With this being smaller now should be able to remove this in clinic under local.  Patient feels she would be able to tolerate this well so we will work on scheduling a time for her to see what works.    Shay Romero MD      Again, thank you for allowing me to participate in the care of your patient.        Sincerely,        Shay Romero MD

## 2024-05-30 NOTE — PROGRESS NOTES
INDICATIONS:                                                    Is a pregnancy test required: Yes.  Was it positive or negative?  Negative  Was a consent obtained?  Yes    Laurence Torres, is a 29 year old female, who had a recent LGSIL pap.  HPV Other positive Yes prior history of abnormal pap. Here today for first colposcopy. Discussed indication, risks of infection and bleeding.  Not currently SA  Non smoker  Did gardasil vaccine series in 2008, 2009 and 2018    Her last pap was   Lab Results   Component Value Date    GYNINTERP  04/18/2024     Low-grade squamous intraepithelial lesion (LSIL) encompassing HPV/mild dysplasia/CIN1    .  2/13/18 ASCUS pap, + HR HPV (not 16/18) in 22 yo.  2/3/20 NIL pap   5/1/23 NIL pap, + HR HPV (not 16/18) in 27 yo. Plan 1 year cotest  4/18/24 LSIL pap, + HR HPV (not 16 or 18). Plan: [place] bef 7/18/24 4/25/24 pt notified by phone and transferred to scheduling.    PROCEDURE:                                                      Cervix is stained with acetic acid and viewed colposcopically. Squamocolumnar junction is visualized in it's entirety. acetowhite lesion(s) noted at 9 o'clock . Biopsy done  Yes x 1 . Endocervical curretage Not Done             POST PROCEDURE:                                                      IMPRESSION: Patient tolerated procedure well and colposcopy adequate    PLAN : Await the results of the biopsies.  She tolerated the procedure well. There were no complications. Patient was discharged in stable condition.    Patient advised to call the clinic if excessive bleeding, pelvic pain, or fever.     Follow-up based on pathology results.    Patient Instructions   Reviewed pap smear and HPV testing results over the last couple of years.  With persistent HPV presence and low grade changes on pap this year, I do recommend colposcopy.  Colposcopy explained and discussed.  Performed without issues.  I will call Laurence with biopsy result and follow up plan next  week.    Patricia Canales MD

## 2024-05-31 ENCOUNTER — LAB (OUTPATIENT)
Dept: LAB | Facility: CLINIC | Age: 29
End: 2024-05-31
Payer: COMMERCIAL

## 2024-05-31 ENCOUNTER — OFFICE VISIT (OUTPATIENT)
Dept: OBGYN | Facility: CLINIC | Age: 29
End: 2024-05-31
Payer: COMMERCIAL

## 2024-05-31 VITALS — DIASTOLIC BLOOD PRESSURE: 66 MMHG | WEIGHT: 136 LBS | BODY MASS INDEX: 20.68 KG/M2 | SYSTOLIC BLOOD PRESSURE: 114 MMHG

## 2024-05-31 DIAGNOSIS — R87.612 LGSIL ON PAP SMEAR OF CERVIX: Primary | ICD-10-CM

## 2024-05-31 DIAGNOSIS — Z01.812 PRE-PROCEDURE LAB EXAM: ICD-10-CM

## 2024-05-31 DIAGNOSIS — B97.7 HPV IN FEMALE: ICD-10-CM

## 2024-05-31 LAB — HCG UR QL: NEGATIVE

## 2024-05-31 PROCEDURE — 88305 TISSUE EXAM BY PATHOLOGIST: CPT | Performed by: PATHOLOGY

## 2024-05-31 PROCEDURE — 57455 BIOPSY OF CERVIX W/SCOPE: CPT | Performed by: OBSTETRICS & GYNECOLOGY

## 2024-05-31 PROCEDURE — 81025 URINE PREGNANCY TEST: CPT

## 2024-05-31 NOTE — PATIENT INSTRUCTIONS
Reviewed pap smear and HPV testing results over the last couple of years.  With persistent HPV presence and low grade changes on pap this year, I do recommend colposcopy.  Colposcopy explained and discussed.  Performed without issues.  I will call Laurence with biopsy result and follow up plan next week.

## 2024-06-05 ENCOUNTER — PATIENT OUTREACH (OUTPATIENT)
Dept: OBGYN | Facility: CLINIC | Age: 29
End: 2024-06-05

## 2024-06-18 ENCOUNTER — PATIENT OUTREACH (OUTPATIENT)
Dept: FAMILY MEDICINE | Facility: CLINIC | Age: 29
End: 2024-06-18

## 2024-08-02 ENCOUNTER — VIRTUAL VISIT (OUTPATIENT)
Dept: URGENT CARE | Facility: CLINIC | Age: 29
End: 2024-08-02
Payer: COMMERCIAL

## 2024-08-02 DIAGNOSIS — S01.331A COMPLICATION OF EAR PIERCING, RIGHT, INITIAL ENCOUNTER: Primary | ICD-10-CM

## 2024-08-02 PROCEDURE — 99212 OFFICE O/P EST SF 10 MIN: CPT | Mod: 95

## 2024-08-02 NOTE — PROGRESS NOTES
Assessment & Plan     Complication of ear piercing, right, initial encounter  The symptoms including, swelling and redness of the tragus at the piercing site, enlarged cervical lymph node, fever and oral canker sores are all expected responses to a localized infection. Symptoms are all improving or resolved. No treatment is needed.    Return in about 1 month (around 9/2/2024) for Physical Exam with primary care provider.       [facility]    Subjective   Laurence Torres is a 29 year old who presents for the following health issues    HPI    Laurence presents via video after she had her tragus pierced 5 days ago.  This became infected so she removed the piercing.  It was swollen and she felt tenderness inside her ear and a swollen lymph node in the right side of her neck.  She did have a fever initially as well.  She also notes that she had 2 canker sores inside her mouth.  These symptoms have all improved.  The swelling and redness around the tragus has resolved and she is fever free.  There is no discharge or draining.  The mass in her neck is smaller and barely palpable.  She describes it as about the size of a pea.    Review of Systems   ROS negative except as stated above.      Objective    Physical Exam   GENERAL: Healthy, alert and no distress  EYES: Eyes grossly normal to inspection.  No discharge or erythema, or obvious scleral/conjunctival abnormalities.  HENT: Normal cephalic/atraumatic.  External ears, nose and mouth without ulcers or lesions.  No nasal drainage visible.  RESP: No audible cough wheeze or visible cyanosis.  No visible retractions or increased work of breathing.    SKIN: Visible skin clear. No significant rash, abnormal pigmentation or lesions.  NEURO: Cranial nerves grossly intact.  Mentation and speech appropriate for age.  PSYCH: Mentation appears normal, affect normal/bright, judgement and insight intact, normal speech and appearance  well-groomed.    Type of service:  Video Visit  Video Start Time: 10:30AM  Video End Time: 10:37AM  Originating Location (pt. Location): Home  Distant Location (provider location):  [facility]- offsite at home, [facility]  Platform used for Video Visit: Long Play    No results found for any visits on 08/02/24.

## 2024-08-11 ENCOUNTER — VIRTUAL VISIT (OUTPATIENT)
Dept: URGENT CARE | Facility: CLINIC | Age: 29
End: 2024-08-11
Payer: COMMERCIAL

## 2024-08-11 DIAGNOSIS — N94.9 GENITAL LESION, FEMALE: Primary | ICD-10-CM

## 2024-08-11 PROCEDURE — 99207 PR NO CHARGE LOS: CPT

## 2024-08-11 NOTE — PROGRESS NOTES
Pt presents for video visit with lesion to perineal area she noticed yesterday. Denies any pain to the area, did have some clear discharge. As this is a video visit, unable to fully assess the lesion, so recommended that patient go to UC for further evaluation or to PCP this week. She states she'll go to UC.

## 2024-08-12 ENCOUNTER — OFFICE VISIT (OUTPATIENT)
Dept: FAMILY MEDICINE | Facility: CLINIC | Age: 29
End: 2024-08-12
Payer: COMMERCIAL

## 2024-08-12 VITALS
SYSTOLIC BLOOD PRESSURE: 106 MMHG | RESPIRATION RATE: 18 BRPM | OXYGEN SATURATION: 98 % | HEIGHT: 68 IN | HEART RATE: 64 BPM | DIASTOLIC BLOOD PRESSURE: 68 MMHG | BODY MASS INDEX: 20.52 KG/M2 | WEIGHT: 135.4 LBS | TEMPERATURE: 97.6 F

## 2024-08-12 DIAGNOSIS — F41.9 ANXIETY: Primary | ICD-10-CM

## 2024-08-12 DIAGNOSIS — R39.89 GENITAL SORE: ICD-10-CM

## 2024-08-12 PROCEDURE — 36415 COLL VENOUS BLD VENIPUNCTURE: CPT | Performed by: PHYSICIAN ASSISTANT

## 2024-08-12 PROCEDURE — 99213 OFFICE O/P EST LOW 20 MIN: CPT | Performed by: PHYSICIAN ASSISTANT

## 2024-08-12 PROCEDURE — 86780 TREPONEMA PALLIDUM: CPT | Performed by: PHYSICIAN ASSISTANT

## 2024-08-12 PROCEDURE — 87389 HIV-1 AG W/HIV-1&-2 AB AG IA: CPT | Performed by: PHYSICIAN ASSISTANT

## 2024-08-12 RX ORDER — SERTRALINE HYDROCHLORIDE 25 MG/1
TABLET, FILM COATED ORAL
Qty: 173 TABLET | Refills: 0 | Status: SHIPPED | OUTPATIENT
Start: 2024-08-12 | End: 2024-08-26

## 2024-08-12 ASSESSMENT — ENCOUNTER SYMPTOMS
NAUSEA: 0
HEMATURIA: 0
NERVOUS/ANXIOUS: 1
DIAPHORESIS: 0
DYSURIA: 0
CHILLS: 0
ABDOMINAL PAIN: 0
FEVER: 0

## 2024-08-12 ASSESSMENT — ASTHMA QUESTIONNAIRES
QUESTION_3 LAST FOUR WEEKS HOW OFTEN DID YOUR ASTHMA SYMPTOMS (WHEEZING, COUGHING, SHORTNESS OF BREATH, CHEST TIGHTNESS OR PAIN) WAKE YOU UP AT NIGHT OR EARLIER THAN USUAL IN THE MORNING: NOT AT ALL
QUESTION_4 LAST FOUR WEEKS HOW OFTEN HAVE YOU USED YOUR RESCUE INHALER OR NEBULIZER MEDICATION (SUCH AS ALBUTEROL): ONCE A WEEK OR LESS
ACT_TOTALSCORE: 21
QUESTION_5 LAST FOUR WEEKS HOW WOULD YOU RATE YOUR ASTHMA CONTROL: WELL CONTROLLED
QUESTION_1 LAST FOUR WEEKS HOW MUCH OF THE TIME DID YOUR ASTHMA KEEP YOU FROM GETTING AS MUCH DONE AT WORK, SCHOOL OR AT HOME: A LITTLE OF THE TIME
QUESTION_2 LAST FOUR WEEKS HOW OFTEN HAVE YOU HAD SHORTNESS OF BREATH: ONCE OR TWICE A WEEK
ACT_TOTALSCORE: 21

## 2024-08-12 ASSESSMENT — PAIN SCALES - GENERAL: PAINLEVEL: NO PAIN (0)

## 2024-08-12 ASSESSMENT — ANXIETY QUESTIONNAIRES
7. FEELING AFRAID AS IF SOMETHING AWFUL MIGHT HAPPEN: NEARLY EVERY DAY
8. IF YOU CHECKED OFF ANY PROBLEMS, HOW DIFFICULT HAVE THESE MADE IT FOR YOU TO DO YOUR WORK, TAKE CARE OF THINGS AT HOME, OR GET ALONG WITH OTHER PEOPLE?: SOMEWHAT DIFFICULT
2. NOT BEING ABLE TO STOP OR CONTROL WORRYING: NEARLY EVERY DAY
7. FEELING AFRAID AS IF SOMETHING AWFUL MIGHT HAPPEN: NEARLY EVERY DAY
3. WORRYING TOO MUCH ABOUT DIFFERENT THINGS: MORE THAN HALF THE DAYS
GAD7 TOTAL SCORE: 15
5. BEING SO RESTLESS THAT IT IS HARD TO SIT STILL: SEVERAL DAYS
4. TROUBLE RELAXING: SEVERAL DAYS
6. BECOMING EASILY ANNOYED OR IRRITABLE: NEARLY EVERY DAY
GAD7 TOTAL SCORE: 15
IF YOU CHECKED OFF ANY PROBLEMS ON THIS QUESTIONNAIRE, HOW DIFFICULT HAVE THESE PROBLEMS MADE IT FOR YOU TO DO YOUR WORK, TAKE CARE OF THINGS AT HOME, OR GET ALONG WITH OTHER PEOPLE: SOMEWHAT DIFFICULT
1. FEELING NERVOUS, ANXIOUS, OR ON EDGE: MORE THAN HALF THE DAYS

## 2024-08-12 NOTE — PROGRESS NOTES
Assessment & Plan     Genital sore  Patient is a 29 YOF who presents to clinic due to improving sore in the inferior aspect of left inguinal crease.  Patient concern for possible STI such as syphilis, but notes she has not been sexually active in 2 years.  She did have STI screening last year.  Vital signs normal.  Physical exam findings are most consistent with healed ingrown hair.  Shared decision making completed patient would like to complete HIV and syphilis screening.  Will complete screening as requested.  - Treponema Abs w Reflex to RPR and Titer; Future  - HIV Antigen Antibody Combo; Future  - Treponema Abs w Reflex to RPR and Titer  - HIV Antigen Antibody Combo    Anxiety  Patient also notes significant anxiety that is affecting her daily.  History of postpartum depression treated with Zoloft.  Zoloft worked well to manage her symptoms, but symptoms returned after patient discontinued medication.  Discussed treatment options including medication management and counseling.  Patient is agreeable to both.  As anxiety is affecting patient daily.  Will restart Zoloft.  Referral placed for counseling.  Patient to follow-up with PCP for recheck within the next few months.  Sooner follow-up for new or worsening symptoms.  - sertraline (ZOLOFT) 25 MG tablet; Take 1 tablet (25 mg) by mouth daily for 7 days, THEN 2 tablets (50 mg) daily for 83 days.  - Adult Mental Health  Referral; Future            See Patient Instructions    Thelma Dang is a 29 year old, presenting for the following health issues:  Derm Problem      8/12/2024    10:48 AM   Additional Questions   Roomed by Billie CHAVES MA   Accompanied by No one         8/12/2024    10:48 AM   Patient Reported Additional Medications   Patient reports taking the following new medications None     History of Present Illness       Mental Health Follow-up:  Patient presents to follow-up on Depression & Anxiety.Patient's depression since last visit has  "been:  Medium  The patient is not having other symptoms associated with depression.  Patient's anxiety since last visit has been:  Bad  The patient is not having other symptoms associated with anxiety.  Any significant life events: No  Patient is feeling anxious or having panic attacks.  Patient has no concerns about alcohol or drug use.    Reason for visit:  Illness  Symptom onset:  1-3 days ago    She eats 2-3 servings of fruits and vegetables daily.She consumes 1 sweetened beverage(s) daily.She exercises with enough effort to increase her heart rate 30 to 60 minutes per day.  She exercises with enough effort to increase her heart rate 5 days per week.   She is taking medications regularly.       Patient notes sore in the labia area. No pain or itching in the area. Last sexual activity was March 2022. She had testing completed May 2023. She has history of cysts in the area. Area is improving and patient is applying Vaseline. Patient is on cycle now.     Patient notes that she is concerns about anxiety. She notes she sweats profusely when anxious. She deals with anxiety every day. She has to  self every time she goes to a new environment. She has anxiety attacks periodically. She takes Ashwaganda OTC. History of post partum depression. Patient was on Sertraline which helped a lot but patient took her self off of it due to worry about side effects.     Review of Systems   Constitutional:  Negative for chills, diaphoresis and fever.   Gastrointestinal:  Negative for abdominal pain and nausea.   Genitourinary:  Positive for genital sores and vaginal bleeding. Negative for dysuria, hematuria and vaginal discharge.   Psychiatric/Behavioral:  The patient is nervous/anxious.            Objective    /68   Pulse 64   Temp 97.6  F (36.4  C) (Temporal)   Resp 18   Ht 1.727 m (5' 8\")   Wt 61.4 kg (135 lb 6.4 oz)   LMP  (LMP Unknown)   SpO2 98%   Breastfeeding No   BMI 20.59 kg/m    Body mass index is 20.59 " kg/m .  Physical Exam  Vitals and nursing note reviewed.   Constitutional:       General: She is not in acute distress.     Appearance: Normal appearance.   HENT:      Head: Normocephalic and atraumatic.      Mouth/Throat:      Mouth: Mucous membranes are moist.      Pharynx: Oropharynx is clear.   Eyes:      Extraocular Movements: Extraocular movements intact.      Pupils: Pupils are equal, round, and reactive to light.   Cardiovascular:      Rate and Rhythm: Normal rate and regular rhythm.      Heart sounds: Normal heart sounds.   Pulmonary:      Effort: Pulmonary effort is normal.      Breath sounds: Normal breath sounds.   Abdominal:      General: Bowel sounds are normal.      Palpations: Abdomen is soft.      Tenderness: There is no abdominal tenderness. There is no guarding or rebound.   Genitourinary:     Comments: Inferior aspect of left inguinal crease with less than 0.25 cm area of shiny hypopigmentation.  No ulceration, swelling, tenderness, erythema, induration.  Musculoskeletal:         General: Normal range of motion.      Cervical back: Normal range of motion.   Skin:     General: Skin is warm and dry.   Neurological:      General: No focal deficit present.      Mental Status: She is alert.   Psychiatric:         Mood and Affect: Mood normal.         Behavior: Behavior normal.                    Signed Electronically by: Patricia George PA-C

## 2024-08-12 NOTE — PATIENT INSTRUCTIONS
The sore appears to be from an ingrown hair and looks to be healing.  Just to confirm we are checking syphilis and HIV testing and results will be sent to [facility].    To help with anxiety you have been prescribed sertraline/Zoloft and I have placed a referral for counseling.  Scheduling will call you to set up your counseling appointment.  Keep in mind that it can take 6-8 weeks for sertraline/Zoloft to have full effects.  If you are ever in need of talking to someone immediately, I have added crisis information below.  You have also been scheduled for a follow-up visit with one of our primary care providers.  Please reach out with any questions or concerns.    In an emergency--call 911  Don't leave the person alone. Anyone who is at imminent risk of suicide needs psychiatric services right away. The person must be constantly watched, and never left out of sight. Call 911 or a 24-hour suicide crisis hotline. You can search for this online. You can also take the person to the nearest hospital emergency room.     Don't keep it a secret and don't wait  Call a mental health clinic or a licensed mental health professional in your area right away. This may be a psychiatrist, clinical psychologist, psychiatric or licensed clinical , marriage and family counselor, or clergy. If you don't know how to contact such professionals and there is an immediate risk, call 911. Tell them you need help for a person who is thinking about suicide.     Resources  National Suicide Prevention Omjnivir328-786-1734 (270-767-NPPA)www.suicidepreventionlifeline.org  National Suicide Qcdghep663-636-7588 (800-SUICIDE)  National [place] of Mental Buuzyu797-793-5243cmu.nimh.nih.gov  National [place] on Mental Rioexvn651-654-2584ast.maryjane.org  Mental Health Plmamgk845-299-2950syt.nmha.org

## 2024-08-13 ENCOUNTER — TELEPHONE (OUTPATIENT)
Dept: NURSING | Facility: CLINIC | Age: 29
End: 2024-08-13
Payer: COMMERCIAL

## 2024-08-13 ENCOUNTER — VIRTUAL VISIT (OUTPATIENT)
Dept: PSYCHOLOGY | Facility: CLINIC | Age: 29
End: 2024-08-13
Attending: PHYSICIAN ASSISTANT
Payer: COMMERCIAL

## 2024-08-13 DIAGNOSIS — F41.9 ANXIETY: ICD-10-CM

## 2024-08-13 DIAGNOSIS — F41.1 GAD (GENERALIZED ANXIETY DISORDER): Primary | ICD-10-CM

## 2024-08-13 LAB
HIV 1+2 AB+HIV1 P24 AG SERPL QL IA: NONREACTIVE
T PALLIDUM AB SER QL: NONREACTIVE

## 2024-08-13 PROCEDURE — 90791 PSYCH DIAGNOSTIC EVALUATION: CPT | Mod: 95 | Performed by: MARRIAGE & FAMILY THERAPIST

## 2024-08-13 ASSESSMENT — COLUMBIA-SUICIDE SEVERITY RATING SCALE - C-SSRS
1. IN THE PAST MONTH, HAVE YOU WISHED YOU WERE DEAD OR WISHED YOU COULD GO TO SLEEP AND NOT WAKE UP?: NO
ATTEMPT LIFETIME: NO
6. HAVE YOU EVER DONE ANYTHING, STARTED TO DO ANYTHING, OR PREPARED TO DO ANYTHING TO END YOUR LIFE?: NO
2. HAVE YOU ACTUALLY HAD ANY THOUGHTS OF KILLING YOURSELF?: NO
TOTAL  NUMBER OF ABORTED OR SELF INTERRUPTED ATTEMPTS LIFETIME: NO
1. HAVE YOU WISHED YOU WERE DEAD OR WISHED YOU COULD GO TO SLEEP AND NOT WAKE UP?: YES
TOTAL  NUMBER OF INTERRUPTED ATTEMPTS LIFETIME: NO

## 2024-08-13 ASSESSMENT — PATIENT HEALTH QUESTIONNAIRE - PHQ9
SUM OF ALL RESPONSES TO PHQ QUESTIONS 1-9: 7
10. IF YOU CHECKED OFF ANY PROBLEMS, HOW DIFFICULT HAVE THESE PROBLEMS MADE IT FOR YOU TO DO YOUR WORK, TAKE CARE OF THINGS AT HOME, OR GET ALONG WITH OTHER PEOPLE: SOMEWHAT DIFFICULT
SUM OF ALL RESPONSES TO PHQ QUESTIONS 1-9: 7

## 2024-08-13 NOTE — PROGRESS NOTES
Answers submitted by the patient for this visit:  Patient Health Questionnaire (Submitted on 2024)  If you checked off any problems, how difficult have these problems made it for you to do your work, take care of things at home, or get along with other people?: Somewhat difficult  PHQ9 TOTAL SCORE: 7        [facility]         PATIENT'S NAME: Laurence Torres  PREFERRED NAME: Laurence  PRONOUNS:       MRN: [id number]  : 1995  ADDRESS: [address] [place] [address] [address]  [facility] [address]  ACCT. NUMBER:  [id number]  DATE OF SERVICE: 24  START TIME: 10:31a  END TIME: 11:30a  PREFERRED PHONE: [phone]  May we leave a program related message:   EMERGENCY CONTACT: was not obtained  .  SERVICE MODALITY:  Video Visit:      Provider verified identity through the following two step process.  Patient provided:  Patient     Telemedicine Visit: The patient's condition can be safely assessed and treated via synchronous audio and visual telemedicine encounter.      Reason for Telemedicine Visit: Services only offered telehealth    Originating Site (Patient Location): Patient's home    Distant Site (Provider Location): Provider Remote Setting- Home Office    Consent:  The patient/guardian has verbally consented to: the potential risks and benefits of telemedicine (video visit) versus in person care; bill my insurance or make self-payment for services provided; and responsibility for payment of non-covered services.     Patient would like the video invitation sent by:  Send to e-mail at: [email or web address]    Mode of Communication:  Video Conference via [facility]    Distant Location (Provider):  Off-site    As the provider I attest to compliance with applicable laws and regulations related to telemedicine.    UNIVERSAL ADULT Mental Health DIAGNOSTIC ASSESSMENT    Identifying Information:  Patient is a 29 year old,  individual.  Patient was referred for an assessment by primary care  clinic.  Patient attended the session alone.    Chief Complaint:   Overall MH related challenges, high anxiety sx, over fixating on topics which cause anxiety. Significant difficulty with handling stressors, over thinking which leads to significant thoughts and physical sx. Pt notes health related anxiety tends to be a major trigger; also related to life path and what life purpose is. Comparing self to peers same age causes high anxiety.  -Pt notes that when she is not busy, her mind tends to wander and will over thinking.     -Pt had post partum depressive sx after first child, lasted a few months but did not use any interventions. 2nd child triggered post partum anxiety which led to going to hospital for 1 night, but has lasted for the past 3+ years.   -Pt notes being judged in her past about having a child at such a young age    -PT notes generally being more isolative daily, no social interactions outside of mother and 1 friend. Pt has some social anxiety and not wanting to be around people.     Social/Family History:  Patient reported they grew up in [place] [facility]  .  They were raised by biological parents  .  Parents  / .  Patient reported that their childhood was stable.  Patient described their current relationships with family of origin as very close with mother, father is more emotional and less close. Close with all of siblings. Parents live in [place], mother lives very close by.   Pt has 5 full siblings and is youngest in family.     The patient describes their cultural background as .  Cultural influences and impact on patient's life structure, values, norms, and healthcare: N/A.  These factors will be addressed in the Preliminary Treatment plan. Patient identified their preferred language to be English. Patient reported they does not need the assistance of an  or other support involved in therapy.     Patient reported had no significant delays in developmental  tasks.   Patient's highest education level was college graduate  .    -PT currently in MA for  through [employer]. Previous hx of business education, will be done with program in fall of 2025. Hoping simply for a good paying job upon completing program.   -Upcoming fall semester will be busy, 5 classes split in the semester    Patient identified the following learning problems: none reported.  Modifications will not be used to assist communication in therapy. Patient reports they are  able to understand written materials.    Patient's current relationship status is single.   Patient identified their sexual orientation as heterosexual.  Patient reported having 2 child(guille). Patient identified mother as part of their support system.  Patient identified the quality of these relationships as stable and meaningful, Pt has stable relationship with father of her children.    Patient's current living/housing situation involves staying in own home/apartment.  The immediate members of family and household include Celestine, 11,Son and 3y son and they report that housing is stable.     Patient is currently employed fulltime.  Patient reports their finances are obtained through employment. Patient does identify finances as a current stressor. 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     Patient reported that they have not been involved with the legal system. Patient does not report being under probation/ parole/ jurisdiction. They are not under any current court jurisdiction. .    Patient's Strengths and Limitations:  Patient identified the following strengths or resources that will help them succeed in treatment: family support and positive work environment. Things that may interfere with the patient's success in treatment include: few friends, financial hardship, and lack of social support.     Assessments:  The following  assessments were completed by patient for this visit:  PHQ9:       4/2/2021     1:29 PM 11/8/2021     8:37 AM 5/1/2023     9:25 AM 4/18/2024    12:42 PM 8/13/2024    10:03 AM   PHQ-9 SCORE   PHQ-9 Total Score MyChart    4 (Minimal depression) 7 (Mild depression)   PHQ-9 Total Score 3 1 0 4 7     GAD7:       4/2/2021     1:29 PM 11/8/2021     8:37 AM 5/1/2023     9:25 AM 8/12/2024    10:40 AM   ROBERTA-7 SCORE   Total Score    15 (severe anxiety)   Total Score 2 2 8 15     PROMIS 10-Global Health (only subscores and total score):       8/13/2024    10:13 AM   PROMIS-10 Scores Only   Global Mental Health Score 8   Global Physical Health Score 16   PROMIS TOTAL - SUBSCORES 24       Personal and Family Medical History:  Patient does report a family history of mental health concerns.  Patient reports family history includes Lung Cancer in her maternal grandmother; No Known Problems in her brother, father, maternal grandfather, mother, paternal grandfather, paternal grandmother, sister, and another family member. Mother has MH related issues    Patient does report Mental Health Diagnosis and/or Treatment.  Patient Patient reported the following previous diagnoses which include(s): an Anxiety Disorder and Depression. Patient has received mental health services in the past:  therapy, IP hospitalization post partum .  Psychiatric Hospitalizations: [facility] post partum anxiety .  Patient denies a history of civil commitment.  Patient is receiving other mental health services.  These include  medications through PCP .       Patient has had a physical exam to rule out medical causes for current symptoms.  Date of last physical exam was within the past year. Client was encouraged to follow up with PCP if symptoms were to develop. The patient has a [facility] Provider, who is named Laura - [place], [facility]..  Patient reports no current medical concerns.  Patient denies any issues  with pain.   There are not significant appetite / nutritional concerns / weight changes.   Patient does not report a history of head injury / trauma / cognitive impairment.      Patient reports current meds as:   Current Outpatient Medications   Medication Sig Dispense Refill    albuterol (PROAIR HFA/PROVENTIL HFA/VENTOLIN HFA) 108 (90 Base) MCG/ACT inhaler Inhale 2 puffs into the lungs every 6 hours as needed for shortness of breath, wheezing or cough 18 g 3    sertraline (ZOLOFT) 25 MG tablet Take 1 tablet (25 mg) by mouth daily for 7 days, THEN 2 tablets (50 mg) daily for 83 days. 173 tablet 0     No current facility-administered medications for this visit.       Medication Adherence:  Patient reports not taking  Pt last took medication at least 3 years    Patient Allergies:  No Known Allergies    Medical History:    Past Medical History:   Diagnosis Date    Abnormal Pap smear of cervix 02/13/2018    Herpes simplex virus (HSV) infection     HSV2 (Diagnosed 1 year ago - asymptomatic)    History of colposcopy 05/31/2024    Postpartum depression     Urinary tract infection          Current Mental Status Exam:   Appearance:  Appropriate    Eye Contact:  Good   Psychomotor:  Normal       Gait / station:  no problem  Attitude / Demeanor: Cooperative   Speech      Rate / Production: Normal/ Responsive      Volume:  Normal  volume      Language:  intact  Mood:   Normal  Affect:   Flat    Thought Content: Clear   Thought Process: Coherent       Associations: No loosening of associations  Insight:   Good   Judgment:  Intact   Orientation:  Person  Attention/concentration: Fair    Substance Use:   Patient did not report a family history of substance use concerns; see medical history section for details.  Patient has not received chemical dependency treatment in the past.  Patient has not ever been to detox.      Patient is not currently receiving any chemical dependency treatment. Patient reported the following problems as  a result of their substance use:   none .    Patient denies using alcohol.  Patient denies using tobacco.  Patient denies using cannabis.  Patient will drink around 4 cups of coffee. Pt does not notice any negative effects while drinking caffeine beverages.   Patient reports using/abusing the following substance(s). Patient reported no other substance use.     Substance Use: No symptoms    Based on the negative CAGE score and clinical interview there  are not indications of drug or alcohol abuse.    Significant Losses / Trauma / Abuse / Neglect Issues:   Patient did not serve in the .  There are indications or report of significant loss, trauma, abuse or neglect issues related to: are no indications and client denies any losses, trauma, abuse, or neglect concerns.    Safety Assessment:   Patient denies current homicidal ideation and behaviors.  Patient denies current self-injurious ideation and behaviors.    Patient denied risk behaviors associated with substance use.   Patient denies any high risk behaviors associated with mental health symptoms.  Patient reports the following current concerns for their personal safety: None.  Patient reports there are not firearms in the house.       There are no firearms in the home..    History of Safety Concerns:  Patient denied a history of homicidal ideation.     Patient denied a history of personal safety concerns.    Patient denied a history of assaultive behaviors.    Patient denied a history of sexual assault behaviors.     Patient denied a history of risk behaviors associated with substance use.  Patient denies any history of high risk behaviors associated with mental health symptoms.  Patient reports the following protective factors: dedication to family or friends    Risk Plan:  See Recommendations for Safety and Risk Management Plan    Review of Symptoms per patient report:   Depression: No symptoms  Sonal:  No Symptoms  Psychosis: No  Symptoms  Anxiety: Excessive worry, Nervousness, Physical complaints, such as headaches, stomachaches, muscle tension, Social anxiety, Sleep disturbance, Ruminations, Poor concentration, and Irritability  Panic:  No symptoms  Post Traumatic Stress Disorder:  No Symptoms     Diagnostic Criteria:   Generalized Anxiety Disorder  A. Excessive anxiety and worry about a number of events or activities (such as work or school performance).   B. The person finds it difficult to control the worry.  C. Select 3 or more symptoms (required for diagnosis). Only one item is required in children.    Functional Status:  Patient reports the following functional impairments:  childcare / parenting, health maintenance, and relationship(s).         Clinical Summary:  1. Psychosocial, Cultural and Contextual Factors: ONgoing anxiety sx  .  2. Principal DSM5 Diagnoses  (Sustained by DSM5 Criteria Listed Above):   300.02 (F41.1) Generalized Anxiety Disorder.    5. Prognosis: Expect Improvement.  6. Likely consequences of symptoms if not treated: .  7. Client strengths include:  has a previous history of therapy .     Recommendations:     1. Plan for Safety and Risk Management:   Safety and Risk: Recommended that patient call 911 or go to the local ED should there be a change in any of these risk factors..          Report to child / adult protection services was NA.     8. Records:   These were reviewed at time of assessment.   Information in this assessment was obtained from the medical record and  provided by patient who is a good historian.    Patient will have open access to their mental health medical record.    9.   Interactive Complexity: No    10. Safety Plan:       Provider Name/ Credentials:  BRITTON Ayers  August 13, 2024

## 2024-08-13 NOTE — TELEPHONE ENCOUNTER
Telephone call  Patient calling she was concerned about the comment at the bottom of her HIV antigen lab called the lab and they explained that she is negative and that is a standard comment at the bottom of the test  Katelyn Powell RN   [facility] Nurse Advisor  6:44 AM 8/13/2024

## 2024-08-14 ENCOUNTER — OFFICE VISIT (OUTPATIENT)
Dept: SURGERY | Facility: CLINIC | Age: 29
End: 2024-08-14
Payer: COMMERCIAL

## 2024-08-14 VITALS — OXYGEN SATURATION: 99 % | HEART RATE: 61 BPM | DIASTOLIC BLOOD PRESSURE: 70 MMHG | SYSTOLIC BLOOD PRESSURE: 108 MMHG

## 2024-08-14 DIAGNOSIS — L72.3 SEBACEOUS CYST OF LEFT AXILLA: Primary | ICD-10-CM

## 2024-08-14 PROCEDURE — 11402 EXC TR-EXT B9+MARG 1.1-2 CM: CPT | Performed by: SURGERY

## 2024-08-14 PROCEDURE — 12031 INTMD RPR S/A/T/EXT 2.5 CM/<: CPT | Performed by: SURGERY

## 2024-08-14 NOTE — TELEPHONE ENCOUNTER
"Patient calling concerned about the comment on the bottom of the HIV test results.     \"Comment: Negative HIV-1 p24 antigen and HIV-1/2 antibody screening test results usually indicate the absence of HIV-1 and HIV-2 infection. However, such negative results do not rule-out acute HIV infection.  If acute HIV-1 or HIV-2 infection is suspected, detection of HIV-1 or HIV-2 RNA  is recommended. \"    RN told patient to not worry about this as it is a routine comment put on the test results. Patient states she has not been sexually active for 2 years.     Patient was concerned on if she kissed someone recently and could have got HIV.     RN explained that HIV is not spread through saliva. It can be spread through blood, semen, vaginal/rectal fluids and breast milk (per cdc).     Patient understood but still feels anxious about this comment on the results.    Patient sent in a [employer] message and it was routed to provider. RN told patient to wait for providers response on this.     RN clarified one more time that patient's HIV was negative/non-reactive.     Sending to provider as an FYI.    Eva Hester RN  [facility]    "

## 2024-08-14 NOTE — PATIENT INSTRUCTIONS
Use tylenol or ibuprofen for pain  The glue over the incision (if used) will fall off on its own and may take a few weeks  You may shower/bathe normally however do not submerge the incision for at least 3 days  Call the clinic at [phone] if you have ongoing bleeding, drainage or other wound concerns and we can make an appointment to have it looked at  Any pathology results generally take a few days through MyChart or a phone call

## 2024-08-14 NOTE — NURSING NOTE
The following medication was given:     MEDICATION:  Lidocaine with epinephrine 1% 1:[id number]  ROUTE: SQ  SITE: see procedure note  DOSE: 10mL  LOT #: [id number]  : [employer]  EXPIRATION DATE: 01-  NDC#: 36712-394-84  Was there drug waste? no  Multi-dose vial: Yes    Jagruti Valentine LPN  August 14, 2024

## 2024-08-14 NOTE — PROGRESS NOTES
PROCEDURE:   Written consent was obtained  The left axilla area was prepped and appropriately anesthetized with 1% lidocaine with epinephrine. Using the usual technique, full thickness elliptical excision in total was performed. The total area excised, including lesion and margins was 1.5 cm.  This appeared as suspected small chronic abscess as I did encounter a small amount of pus but did not see any discrete cyst wall as with the true epidermoid cyst.  Closure was accomplished with interrupted 3-0 vicryl for the deep subcutaneous layer and running subcuticular 4-0 vicryl for the skin.  Incision was covered with dermabond..  The procedure was well tolerated and without complications. Specimen was sent to Pathology.    Shay Romero MD

## 2024-08-14 NOTE — LETTER
8/14/2024      Laurence Torres  [address]  [facility] [address]      Dear Colleague,    Thank you for referring your patient, Laurence Torres, to the [facility]. Please see a copy of my visit note below.    PROCEDURE:   Written consent was obtained  The left axilla area was prepped and appropriately anesthetized with 1% lidocaine with epinephrine. Using the usual technique, full thickness elliptical excision in total was performed. The total area excised, including lesion and margins was 1.5 cm.  This appeared as suspected small chronic abscess as I did encounter a small amount of pus but did not see any discrete cyst wall as with the true epidermoid cyst.  Closure was accomplished with interrupted 3-0 vicryl for the deep subcutaneous layer and running subcuticular 4-0 vicryl for the skin.  Incision was covered with dermabond..  The procedure was well tolerated and without complications. Specimen was sent to Pathology.    Shay Romero MD      Again, thank you for allowing me to participate in the care of your patient.        Sincerely,        Shay Romero MD

## 2024-08-14 NOTE — NURSING NOTE
Laurence Torres's goals for this visit include:   Chief Complaint   Patient presents with    Procedure     Sebaceous cyst of left axilla  Pt states that it shrunk a lot and is the size of a peanut now.       She requests these members of her care team be copied on today's visit information:     PCP: Laura - JULIA Abad [facility]    Referring Provider:  Referred Self, MD  No address on file    /70   Pulse 61   LMP  (LMP Unknown)   SpO2 99%     Do you need any medication refills at today's visit?     Jagruti Valentine LPN on 8/14/2024 at 1:37 PM

## 2024-08-19 PROBLEM — F41.1 GAD (GENERALIZED ANXIETY DISORDER): Status: ACTIVE | Noted: 2024-08-19

## 2024-08-20 ENCOUNTER — TELEPHONE (OUTPATIENT)
Dept: FAMILY MEDICINE | Facility: CLINIC | Age: 29
End: 2024-08-20

## 2024-08-20 NOTE — TELEPHONE ENCOUNTER
Patient calling to look for results from a surgery on the 14th. Writer notes a dermatopathology exam on 8/14/24. Results are still in process:        Writer mentioned to patient that it would be routed to care team to see if there is anything they can see as it has been 6 days since the surgery.    Routing to provider to review and advise - Does patient have to just wait longer? Can provider see any results?    Anibal Elizalde RN  [facility]     No

## 2024-08-21 LAB
PATH REPORT.COMMENTS IMP SPEC: NORMAL
PATH REPORT.COMMENTS IMP SPEC: NORMAL
PATH REPORT.FINAL DX SPEC: NORMAL
PATH REPORT.GROSS SPEC: NORMAL
PATH REPORT.MICROSCOPIC SPEC OTHER STN: NORMAL
PATH REPORT.RELEVANT HX SPEC: NORMAL

## 2024-08-26 ENCOUNTER — OFFICE VISIT (OUTPATIENT)
Dept: FAMILY MEDICINE | Facility: CLINIC | Age: 29
End: 2024-08-26
Payer: COMMERCIAL

## 2024-08-26 VITALS
OXYGEN SATURATION: 95 % | RESPIRATION RATE: 18 BRPM | HEIGHT: 68 IN | DIASTOLIC BLOOD PRESSURE: 85 MMHG | WEIGHT: 132 LBS | BODY MASS INDEX: 20 KG/M2 | HEART RATE: 54 BPM | SYSTOLIC BLOOD PRESSURE: 131 MMHG | TEMPERATURE: 97.1 F

## 2024-08-26 DIAGNOSIS — M79.10 MYALGIA: Primary | ICD-10-CM

## 2024-08-26 DIAGNOSIS — R20.2 PARESTHESIAS: ICD-10-CM

## 2024-08-26 DIAGNOSIS — F41.1 GAD (GENERALIZED ANXIETY DISORDER): ICD-10-CM

## 2024-08-26 PROCEDURE — G2211 COMPLEX E/M VISIT ADD ON: HCPCS | Performed by: PHYSICIAN ASSISTANT

## 2024-08-26 PROCEDURE — 99213 OFFICE O/P EST LOW 20 MIN: CPT | Performed by: PHYSICIAN ASSISTANT

## 2024-08-26 ASSESSMENT — PAIN SCALES - GENERAL: PAINLEVEL: NO PAIN (0)

## 2024-08-26 NOTE — PROGRESS NOTES
Assessment & Plan     Myalgia  Not constant or severe.  No changes in the skin or reproducible pain.  Low suspicion for underlying inflammatory issue, okay to continue to monitor.  Can continue sparing ibuprofen if needed.  If worsening or changing be reevaluated.    Paresthesias  Suspect related to anxiety.  Move locations and not persistent.    ROBERTA (generalized anxiety disorder)  Patient no longer on sertraline, taken off of her med list.  She will continue follow-up with her therapist and psychiatrist.  Suspect anxiety is causing some of her symptoms.  We did discuss lymph node reacting to piercing that is fairly common especially with her swelling of the piercing itself.  Discussed her negative HIV testing, she does not need to be retested today.                Thelma Dang is a 29 year old, presenting for the following health issues:  Pain        8/26/2024    10:43 AM   Additional Questions   Roomed by meghann machado   Accompanied by none         8/26/2024    10:43 AM   Patient Reported Additional Medications   Patient reports taking the following new medications none     History of Present Illness       Reason for visit:  Pains in legs  Symptom onset:  3-7 days ago   She is taking medications regularly.         Pain History:  When did you first notice your pain? 7 days ago   Have you seen anyone else for your pain? No  How has your pain affected your ability to work? Unable to work due to pain   What type of work do you or did you do? Working from home  Where in your body do you have pain? Musculoskeletal problem/pain  Onset/Duration: 7 days  Description  Location: foot, leg, knee, and hand - bilateral  Joint Swelling: No  Redness: No  Pain: YES  Warmth: No  Intensity:  moderate  Progression of Symptoms:  constant  Accompanying signs and symptoms:   Fevers: No  Numbness/tingling/weakness: YES  History  Trauma to the area: No  Recent illness:  No  Previous similar problem: No  Previous evaluation:   "No  Precipitating or alleviating factors:  Aggravating factors include: none  Therapies tried and outcome: nothing      Has had a variety of symptoms recently and uncertain if she is overthinking them or if something could be going on.  She recently got a new piercing to her nose and her right tragus.  The right tragus became swollen and she did have to remove the piercing.  She had a swollen lymph node near the area which she has never had before.  Started to be concerned about malignancy or HIV.  She has noticed different areas of numbness and tingling to her palms, her legs, her tongue and throat.  Seem to come and go at random.  Recently she started to notice some pain to bilateral lower legs, has not been severe.  When she took ibuprofen it did improve.  Never wakes her up at night.  No associated joint swelling.  Does not impair functioning.              Objective    /85   Pulse 54   Temp 97.1  F (36.2  C) (Temporal)   Resp 18   Ht 1.732 m (5' 8.19\")   Wt 59.9 kg (132 lb)   LMP 08/12/2024 (Approximate)   SpO2 95%   BMI 19.96 kg/m    Body mass index is 19.96 kg/m .  Physical Exam   GENERAL: alert and no distress  HENT: ear canals and TM's normal, nose and mouth without ulcers or lesions  NECK: no adenopathy, no asymmetry, masses, or scars  RESP: lungs clear to auscultation - no rales, rhonchi or wheezes  CV: regular rate and rhythm, normal S1 S2, no S3 or S4, no murmur, click or rub, no peripheral edema   MS: no gross musculoskeletal defects noted, no edema  SKIN: no suspicious lesions or rashes            Signed Electronically by: Jazmyne Marrero PA-C    "

## 2024-08-26 NOTE — PATIENT INSTRUCTIONS
At [facility], we strive to deliver an exceptional experience to you, every time we see you. If you receive a survey, please let us know what we are doing well and/or what we could improve upon, as we do value your feedback.  If you have MyChart, you can expect to receive results automatically within 24 hours of their completion.  Your provider will send a note interpreting your results as well.   If you do not have MyChart, you should receive your results in about a week by mail.    Your care team:                            Family Medicine Internal Medicine   MD Eamon Miramontes, MD Vianca Woods, MD [place] Granda, MD Vania Faust, PAGaudencioC    Edwin Ludwig, MD Pediatrics   Daily Jansen, MD Gabriela Munson, MD Evelina Lopez, APRN CNP Sarah Álvarez APRN CNP   MD Juju Johnson, MD Luzma Bernstein, CNP     Luis F Mederos, CNP Same-Day Provider (No follow-up visits)   ALIYA Simon, DNP Kim Manzo, ALIYA Guillermo, FNP, BC ASHELY FranciscoC     Clinic hours: Monday - Thursday 7 am-6 pm; Fridays 7 am-5 pm.   Urgent care: Monday - Friday 10 am- 8 pm; Saturday and Sunday 9 am-5 pm.    Clinic: [phone]       [facility]: Monday - Thursday 8 am - 7 pm; Friday 8 am - 6 pm  [facility]: [phone]

## 2024-09-11 ENCOUNTER — OFFICE VISIT (OUTPATIENT)
Dept: FAMILY MEDICINE | Facility: CLINIC | Age: 29
End: 2024-09-11
Attending: FAMILY MEDICINE
Payer: COMMERCIAL

## 2024-09-11 VITALS
TEMPERATURE: 98.7 F | BODY MASS INDEX: 20.46 KG/M2 | OXYGEN SATURATION: 100 % | RESPIRATION RATE: 18 BRPM | WEIGHT: 135 LBS | HEIGHT: 68 IN | HEART RATE: 66 BPM | DIASTOLIC BLOOD PRESSURE: 63 MMHG | SYSTOLIC BLOOD PRESSURE: 117 MMHG

## 2024-09-11 DIAGNOSIS — Z11.3 SCREENING FOR STD (SEXUALLY TRANSMITTED DISEASE): ICD-10-CM

## 2024-09-11 DIAGNOSIS — T14.8XXA BRUISE: ICD-10-CM

## 2024-09-11 DIAGNOSIS — R59.9 LYMPH NODE ENLARGEMENT: ICD-10-CM

## 2024-09-11 DIAGNOSIS — M79.10 MYALGIA: Primary | ICD-10-CM

## 2024-09-11 LAB
ERYTHROCYTE [DISTWIDTH] IN BLOOD BY AUTOMATED COUNT: 16.8 % (ref 10–15)
HCT VFR BLD AUTO: 35.9 % (ref 35–47)
HGB BLD-MCNC: 12.4 G/DL (ref 11.7–15.7)
MCH RBC QN AUTO: 25.8 PG (ref 26.5–33)
MCHC RBC AUTO-ENTMCNC: 34.5 G/DL (ref 31.5–36.5)
MCV RBC AUTO: 75 FL (ref 78–100)
PLATELET # BLD AUTO: 463 10E3/UL (ref 150–450)
RBC # BLD AUTO: 4.8 10E6/UL (ref 3.8–5.2)
WBC # BLD AUTO: 5.7 10E3/UL (ref 4–11)

## 2024-09-11 PROCEDURE — 82306 VITAMIN D 25 HYDROXY: CPT | Performed by: FAMILY MEDICINE

## 2024-09-11 PROCEDURE — 36415 COLL VENOUS BLD VENIPUNCTURE: CPT | Performed by: FAMILY MEDICINE

## 2024-09-11 PROCEDURE — 86780 TREPONEMA PALLIDUM: CPT | Performed by: FAMILY MEDICINE

## 2024-09-11 PROCEDURE — 99214 OFFICE O/P EST MOD 30 MIN: CPT | Performed by: FAMILY MEDICINE

## 2024-09-11 PROCEDURE — 82607 VITAMIN B-12: CPT | Performed by: FAMILY MEDICINE

## 2024-09-11 PROCEDURE — 86803 HEPATITIS C AB TEST: CPT | Performed by: FAMILY MEDICINE

## 2024-09-11 PROCEDURE — 87591 N.GONORRHOEAE DNA AMP PROB: CPT | Performed by: FAMILY MEDICINE

## 2024-09-11 PROCEDURE — 87389 HIV-1 AG W/HIV-1&-2 AB AG IA: CPT | Performed by: FAMILY MEDICINE

## 2024-09-11 PROCEDURE — 87491 CHLMYD TRACH DNA AMP PROBE: CPT | Performed by: FAMILY MEDICINE

## 2024-09-11 PROCEDURE — 87340 HEPATITIS B SURFACE AG IA: CPT | Performed by: FAMILY MEDICINE

## 2024-09-11 PROCEDURE — 85027 COMPLETE CBC AUTOMATED: CPT | Performed by: FAMILY MEDICINE

## 2024-09-11 PROCEDURE — 84443 ASSAY THYROID STIM HORMONE: CPT | Performed by: FAMILY MEDICINE

## 2024-09-11 PROCEDURE — 80053 COMPREHEN METABOLIC PANEL: CPT | Performed by: FAMILY MEDICINE

## 2024-09-11 ASSESSMENT — ENCOUNTER SYMPTOMS: FATIGUE: 1

## 2024-09-11 ASSESSMENT — PAIN SCALES - GENERAL: PAINLEVEL: NO PAIN (0)

## 2024-09-11 NOTE — PATIENT INSTRUCTIONS
At [facility], we strive to deliver an exceptional experience to you, every time we see you. If you receive a survey, please let us know what we are doing well and/or what we could improve upon, as we do value your feedback.  If you have MyChart, you can expect to receive results automatically within 24 hours of their completion.  Your provider will send a note interpreting your results as well.   If you do not have MyChart, you should receive your results in about a week by mail.    Your care team:                            Family Medicine Internal Medicine   MD Eamon Miramontes, MD Vianca Woods, MD [place], MD Vania Faust, PAGaudencioC    Edwin Ludwig, MD Pediatrics   Daily Jansen, MD Gabriela Munson, MD Evelina Lopez, APRN CNP Sarah Álvarez APRN CNP   MD Juju Johnson, MD Luzma Bernstein, CNP     Luis F Mederos, CNP Same-Day Provider (No follow-up visits)   ALIYA Simon, DNP Kim Manzo, ALIYA Guillermo, FNP, BC ASHELY FranciscoC     Clinic hours: Monday - Thursday 7 am-6 pm; Fridays 7 am-5 pm.   Urgent care: Monday - Friday 10 am- 8 pm; Saturday and Sunday 9 am-5 pm.    Clinic: [phone]       [facility]: Monday - Thursday 8 am - 7 pm; Friday 8 am - 6 pm  [facility]: [phone]

## 2024-09-11 NOTE — PROGRESS NOTES
Assessment & Plan     Myalgia  -Laurence presented to the clinic complaining of myalgia for the past 2 weeks.   -She denied fever/runny nose/sore throat/chest pain/breathing difficulty/dysuria/diarrhea/vaginal discharge.  -She is pretty anxious and preferred basic screening labs.    PLAN:  -Etiology of myalgia unclear.  Symptoms are mild and intermittent.  Ordered basic labs.  -If labs are stable,Laurence can monitor her symptoms and follow-up in the clinic after 3-4 weeks if symptoms still persist/bothersome.    - CBC with platelets; Future  - Comprehensive metabolic panel (BMP + Alb, Alk Phos, ALT, AST, Total. Bili, TP); Future  - TSH with free T4 reflex; Future  - Vitamin D Deficiency; Future  - Vitamin B12; Future    Bruise  -Small bruise noted in great toe both foot.  -Looks like hematoma underneath nail which possibly could be from pointed toe shoe pressure.  No obvious trauma.    - CBC with platelets; Future  - Comprehensive metabolic panel (BMP + Alb, Alk Phos, ALT, AST, Total. Bili, TP); Future    Lymph node enlargement  -Laurence had right ear piercing 1.5 months ago which was painful and she eventually removed it.  She has been noticing lymph node since then.  Denied being there 2 months before.  -Right solitary (which was tender initially) , postauricular roughly less than 1 cm lymph node palpated.  Likely reactive lymph node.  Recommend watchful monitoring.  -No other lymph nodes palpable.    Screening for STD (sexually transmitted disease)  -Per patient preference.  -Laurence had HIV and treponema testing done last month but preferred to be redone.  -Denied pregnancy testing    - HIV Antigen Antibody Combo; Future  - Treponema Abs w Reflex to RPR and Titer; Future  - Hepatitis B surface antigen; Future  - Hepatitis C Screen Reflex to HCV RNA Quant and Genotype; Future  - Chlamydia trachomatis/Neisseria gonorrhoeae by PCR - Clinic Collect                Subjective   Laurence is a 29 year old, presenting for the  "following health issues:  Fatigue (Times 2 weeks. Body weakness & Leg weakness)      9/11/2024    11:43 AM   Additional Questions   Roomed by Mark   Accompanied by Self     Fatigue  Associated symptoms include fatigue.   History of Present Illness       Reason for visit:  Weakness   She is taking medications regularly.                 Review of Systems  Constitutional, HEENT, cardiovascular, pulmonary, gi and gu systems are negative, except as otherwise noted.      Objective    /63 (BP Location: Left arm, Patient Position: Chair, Cuff Size: Adult Regular)   Pulse 66   Temp 98.7  F (37.1  C) (Temporal)   Resp 18   Ht 1.727 m (5' 8\")   Wt 61.2 kg (135 lb)   LMP 09/06/2024 (Approximate)   SpO2 100%   BMI 20.53 kg/m    Body mass index is 20.53 kg/m .  Physical Exam   GENERAL: alert and no distress  NECK: no adenopathy, no asymmetry, masses, or scars  RESP: lungs clear to auscultation - no rales, rhonchi or wheezes  CV: regular rate and rhythm, normal S1 S2, no S3 or S4, no murmur, click or rub, no peripheral edema  ABDOMEN: soft, nontender, no hepatosplenomegaly, no masses and bowel sounds normal  MS: no gross musculoskeletal defects noted, no edema            Signed Electronically by: Bijal Castellanos MD    "

## 2024-09-12 DIAGNOSIS — E55.9 VITAMIN D DEFICIENCY: Primary | ICD-10-CM

## 2024-09-12 LAB
ALBUMIN SERPL BCG-MCNC: 4.4 G/DL (ref 3.5–5.2)
ALP SERPL-CCNC: 66 U/L (ref 40–150)
ALT SERPL W P-5'-P-CCNC: 10 U/L (ref 0–50)
ANION GAP SERPL CALCULATED.3IONS-SCNC: 11 MMOL/L (ref 7–15)
AST SERPL W P-5'-P-CCNC: 22 U/L (ref 0–45)
BILIRUB SERPL-MCNC: 0.7 MG/DL
BUN SERPL-MCNC: 6.5 MG/DL (ref 6–20)
C TRACH DNA SPEC QL PROBE+SIG AMP: NEGATIVE
CALCIUM SERPL-MCNC: 9.7 MG/DL (ref 8.8–10.4)
CHLORIDE SERPL-SCNC: 104 MMOL/L (ref 98–107)
CREAT SERPL-MCNC: 0.89 MG/DL (ref 0.51–0.95)
EGFRCR SERPLBLD CKD-EPI 2021: >90 ML/MIN/1.73M2
GLUCOSE SERPL-MCNC: 68 MG/DL (ref 70–99)
HBV SURFACE AG SERPL QL IA: NONREACTIVE
HCO3 SERPL-SCNC: 25 MMOL/L (ref 22–29)
HCV AB SERPL QL IA: NONREACTIVE
HIV 1+2 AB+HIV1 P24 AG SERPL QL IA: NONREACTIVE
N GONORRHOEA DNA SPEC QL NAA+PROBE: NEGATIVE
POTASSIUM SERPL-SCNC: 4.1 MMOL/L (ref 3.4–5.3)
PROT SERPL-MCNC: 7.4 G/DL (ref 6.4–8.3)
SODIUM SERPL-SCNC: 140 MMOL/L (ref 135–145)
T PALLIDUM AB SER QL: NONREACTIVE
TSH SERPL DL<=0.005 MIU/L-ACNC: 1.5 UIU/ML (ref 0.3–4.2)
VIT B12 SERPL-MCNC: 422 PG/ML (ref 232–1245)
VIT D+METAB SERPL-MCNC: 12 NG/ML (ref 20–50)

## 2025-05-31 ENCOUNTER — HEALTH MAINTENANCE LETTER (OUTPATIENT)
Age: 30
End: 2025-05-31

## 2025-06-04 ENCOUNTER — PATIENT OUTREACH (OUTPATIENT)
Dept: FAMILY MEDICINE | Facility: CLINIC | Age: 30
End: 2025-06-04
Payer: COMMERCIAL